# Patient Record
Sex: FEMALE | Race: OTHER | HISPANIC OR LATINO | Employment: OTHER | URBAN - METROPOLITAN AREA
[De-identification: names, ages, dates, MRNs, and addresses within clinical notes are randomized per-mention and may not be internally consistent; named-entity substitution may affect disease eponyms.]

---

## 2018-05-07 ENCOUNTER — CONVERSION ENCOUNTER (OUTPATIENT)
Dept: MAMMOGRAPHY | Facility: CLINIC | Age: 83
End: 2018-05-07

## 2018-10-22 RX ORDER — CARVEDILOL 12.5 MG/1
12.5 TABLET ORAL 2 TIMES DAILY WITH MEALS
Refills: 3 | COMMUNITY
Start: 2018-10-02 | End: 2018-12-31 | Stop reason: SDUPTHER

## 2018-10-22 RX ORDER — PRAVASTATIN SODIUM 10 MG
1 TABLET ORAL
Refills: 3 | COMMUNITY
Start: 2018-10-02 | End: 2018-12-31 | Stop reason: SDUPTHER

## 2018-10-22 RX ORDER — TRAMADOL HYDROCHLORIDE 50 MG/1
1 TABLET ORAL
COMMUNITY
Start: 2018-01-11 | End: 2019-09-08 | Stop reason: ALTCHOICE

## 2018-10-22 RX ORDER — GLIMEPIRIDE 2 MG/1
2 TABLET ORAL DAILY
Refills: 3 | COMMUNITY
Start: 2018-10-02 | End: 2018-12-31 | Stop reason: SDUPTHER

## 2018-10-22 RX ORDER — LANCETS 28 GAUGE
EACH MISCELLANEOUS
COMMUNITY
Start: 2018-01-15 | End: 2019-02-13 | Stop reason: SDUPTHER

## 2018-12-31 DIAGNOSIS — E11.9 TYPE 2 DIABETES MELLITUS WITHOUT COMPLICATION, WITHOUT LONG-TERM CURRENT USE OF INSULIN (HCC): Primary | ICD-10-CM

## 2018-12-31 RX ORDER — GLIMEPIRIDE 2 MG/1
2 TABLET ORAL
Qty: 30 TABLET | Refills: 0 | Status: SHIPPED | OUTPATIENT
Start: 2018-12-31 | End: 2019-02-05 | Stop reason: SDUPTHER

## 2018-12-31 RX ORDER — PRAVASTATIN SODIUM 10 MG
10 TABLET ORAL DAILY
Qty: 30 TABLET | Refills: 0 | Status: SHIPPED | OUTPATIENT
Start: 2018-12-31 | End: 2019-02-05 | Stop reason: SDUPTHER

## 2018-12-31 RX ORDER — CARVEDILOL 12.5 MG/1
12.5 TABLET ORAL 2 TIMES DAILY WITH MEALS
Qty: 60 TABLET | Refills: 0 | Status: SHIPPED | OUTPATIENT
Start: 2018-12-31 | End: 2019-02-05 | Stop reason: SDUPTHER

## 2018-12-31 NOTE — TELEPHONE ENCOUNTER
Metformin, pravastatin, glimiperride, carvedilol  Spoke with pt told him mom has not seen the doctor since January 2018 and needs an apt  He states she has a really bad fall and cant go down the steps he will try and bring her in

## 2019-02-04 ENCOUNTER — IN HOME VISIT (OUTPATIENT)
Dept: FAMILY MEDICINE CLINIC | Facility: CLINIC | Age: 84
End: 2019-02-04
Payer: MEDICARE

## 2019-02-04 DIAGNOSIS — M25.552 LEFT HIP PAIN: ICD-10-CM

## 2019-02-04 DIAGNOSIS — M25.552 PAIN OF BOTH HIP JOINTS: Primary | ICD-10-CM

## 2019-02-04 DIAGNOSIS — M25.551 PAIN OF BOTH HIP JOINTS: Primary | ICD-10-CM

## 2019-02-04 DIAGNOSIS — M25.562 CHRONIC PAIN OF LEFT KNEE: ICD-10-CM

## 2019-02-04 DIAGNOSIS — G89.29 CHRONIC BILATERAL LOW BACK PAIN WITH BILATERAL SCIATICA: ICD-10-CM

## 2019-02-04 DIAGNOSIS — N39.490 OVERFLOW INCONTINENCE OF URINE: ICD-10-CM

## 2019-02-04 DIAGNOSIS — R26.9 GAIT DIFFICULTY: ICD-10-CM

## 2019-02-04 DIAGNOSIS — I10 ESSENTIAL HYPERTENSION: ICD-10-CM

## 2019-02-04 DIAGNOSIS — R63.5 WEIGHT GAIN: ICD-10-CM

## 2019-02-04 DIAGNOSIS — Z11.59 ENCOUNTER FOR HEPATITIS C SCREENING TEST FOR LOW RISK PATIENT: ICD-10-CM

## 2019-02-04 DIAGNOSIS — M25.561 CHRONIC PAIN OF RIGHT KNEE: ICD-10-CM

## 2019-02-04 DIAGNOSIS — E11.9 TYPE 2 DIABETES MELLITUS WITHOUT COMPLICATION, WITHOUT LONG-TERM CURRENT USE OF INSULIN (HCC): ICD-10-CM

## 2019-02-04 DIAGNOSIS — E55.9 VITAMIN D DEFICIENCY: ICD-10-CM

## 2019-02-04 DIAGNOSIS — G89.29 CHRONIC PAIN OF RIGHT KNEE: ICD-10-CM

## 2019-02-04 DIAGNOSIS — M54.41 CHRONIC BILATERAL LOW BACK PAIN WITH BILATERAL SCIATICA: ICD-10-CM

## 2019-02-04 DIAGNOSIS — M54.42 CHRONIC BILATERAL LOW BACK PAIN WITH BILATERAL SCIATICA: ICD-10-CM

## 2019-02-04 DIAGNOSIS — G89.29 CHRONIC PAIN OF LEFT KNEE: ICD-10-CM

## 2019-02-04 DIAGNOSIS — M25.551 RIGHT HIP PAIN: ICD-10-CM

## 2019-02-04 PROCEDURE — 99349 HOME/RES VST EST MOD MDM 40: CPT | Performed by: FAMILY MEDICINE

## 2019-02-04 NOTE — LETTER
February 13, 2019     Flex Mendoza  [address] [facility] [address]    Patient: Flex Mendoza   YOB: 1933   Date of Visit: 2/4/2019       Consult home care, consult skilled nursing, consult physical therapy for home  Assess for home aide      Jack Pedersen MD        CC: No Recipients  Jack Pedersen MD  2/13/2019  8:18 AM  Sign at close encounter  Assessment/Plan:    I spent 1 hr and the condition of care and evaluating patient had her home  I am requesting visiting nurses, and physical therapy to come to home since patient is unable to go to a facility  Despite the request of bed patient having labs and x-rays, she is willing to do it but will wait until winter is over  She is in high risk for diabetes complications  Diagnoses and all orders for this visit:    Pain of both hip joints  Comments: We need to assess her hips with x-rays and do a follow-up after this is done  Patient declines going for x-ray until after winter  Type 2 diabetes mellitus without complication, without long-term current use of insulin ([facility])  Comments:  Patient does not follow with cause for more than one year, she is still on metformin and glimepiride  High risk for hypoglycemia  We need labs  Orders:  -     Discontinue: carvedilol (COREG) 12 5 mg tablet; Take 1 tablet (12 5 mg total) by mouth 2 (two) times a day with meals  -     Discontinue: glimepiride (AMARYL) 2 mg tablet; Take 1 tablet (2 mg total) by mouth daily with breakfast  -     Discontinue: metFORMIN (GLUCOPHAGE) 500 mg tablet; Take 1 tablet (500 mg total) by mouth 2 (two) times a day with meals  -     Discontinue: pravastatin (PRAVACHOL) 10 mg tablet; Take 1 tablet (10 mg total) by mouth daily  -     Comprehensive metabolic panel; Future  -     Lipid Panel with Direct LDL reflex;  Future  -     Hemoglobin A1C; Future  -     Microalbumin / creatinine urine ratio    Chronic bilateral low back pain with bilateral sciatica  Comments:  Since patient has radicular pain and weakness on legs would prefer to do a MRI of lumbar spine  Orders:  -     MRI lumbar spine wo contrast; Future    Weight gain  Comments:  Related to lack of activity  Gait difficulty    Essential hypertension  -     TSH, 3rd generation with Free T4 reflex; Future  -     CBC and differential; Future    Overflow incontinence of urine  -     UA w Reflex to Microscopic w Reflex to Culture    Encounter for hepatitis C screening test for low risk patient  Comments:  Explained the patient the need for hepatitis C screening  Orders:  -     Hepatitis C antibody; Future    Vitamin D deficiency  Comments: To check levels to decide treatment  Orders:  -     Vitamin D 25 hydroxy; Future    Left hip pain  Comments:  X-ray of left hip is needed  Orders:  -     XR hip/pelv 2-3 vws left if performed; Future    Right hip pain  Comments:  X-ray of right hip is needed  Orders:  -     XR hip/pelv 2-3 vws right if performed; Future    Chronic pain of left knee  Comments: To check x-ray of left knee  Orders:  -     XR knee 3 vw left non injury; Future    Chronic pain of right knee  Comments: To check x-ray of left knee  Orders:  -     XR knee 3 vw right non injury; Future    Other orders  -     FREESTYLE UNISTICK II LANCETS MISC; check blood sugar twice daily  -     glucose blood (FREESTYLE LITE) test strip; check blood sugar by Subcutaneous route 2 times every day  -     carvedilol (COREG) 12 5 mg tablet; Every 12 hours  -     glimepiride (AMARYL) 2 mg tablet; every 24 hours  -     metFORMIN (GLUCOPHAGE) 500 mg tablet; take 1 tablet (500MG)  by oral route 2 times every day with morning and evening meals  -     pravastatin (PRAVACHOL) 10 mg tablet; take 1 tablet (10MG)  by oral route  every day  -     Discontinue: traMADol (ULTRAM) 50 mg tablet; take 1/2 tablet by oral route  every 6 hours as needed          Subjective:      Patient ID: Bret Soto is a 80 y o  female  THIS IS A HOME VISIT  Patient reports multiple falls at home, she is confined to a 2nd floor due to gait difficulties and legs pain with weakness  She is not cooking him more  She lives with her son who does all the cooking home and helps with the activities of daily living  Patient complains severe pain in both hips  She is declining going to hospital for studies  She also states that she suffers of uterine prolapse and is causing urinary incontinence, and she needs to go to the bathroom very frequently  She has been confined to the 2nd floor for one year  She states that her blood sugar is okay, but is afraid to test herself  She also has chronic severe back pain, per patient is afraid to do procedures or tests in is very clear that she does not want any surgeries  She probably will accept physical therapy  She will set visiting nurses at home and possible visit for physical therapy  The following portions of the patient's history were reviewed and updated as appropriate: allergies, current medications, past family history, past medical history, past social history, past surgical history and problem list     Review of Systems   Constitutional: Positive for activity change ( she cannot go outside of down stairs due to pain in both hips and legs ) and unexpected weight change (Gaining weight possible 20 lb in the past one year )  HENT: Negative  Eyes: Positive for visual disturbance  Respiratory: Negative  Cardiovascular: Negative  Musculoskeletal: Positive for arthralgias ( almost all joints are hurting), back pain ( chronic ), gait problem (  Due to weakness of both legs, pain in both hips and knees ), joint swelling ( both knees ) and myalgias ( as above both legs  )  Neurological: Positive for weakness  Negative for seizures, syncope, speech difficulty and numbness     Psychiatric/Behavioral: Negative for agitation, behavioral problems, confusion, decreased concentration and sleep disturbance  Objective:      /80 (BP Location: Right arm, Patient Position: Sitting, Cuff Size: Large)   Pulse 80   Temp (!) 96 8 °F (36 °C) (Oral)   Resp 18   Ht 5' 9" (1 753 m)   Wt 95 3 kg (210 lb)   SpO2 96%   BMI 31 01 kg/m²           Physical Exam   HENT:   Head: Normocephalic  Eyes: Pupils are equal, round, and reactive to light  EOM are normal    Neck: Neck supple  Cardiovascular: Normal rate and regular rhythm  Pulmonary/Chest: Effort normal    Abdominal: Soft  There is tenderness  Musculoskeletal: Normal range of motion  She exhibits tenderness (Pain in decreased range of motion of both hips in both knees  Gait very unsteady  )  Neurological: She is alert  Skin: Skin is warm and dry  Psychiatric: She has a normal mood and affect   Her behavior is normal

## 2019-02-13 ENCOUNTER — TELEPHONE (OUTPATIENT)
Dept: FAMILY MEDICINE CLINIC | Facility: CLINIC | Age: 84
End: 2019-02-13

## 2019-02-13 VITALS
HEART RATE: 80 BPM | OXYGEN SATURATION: 96 % | TEMPERATURE: 96.8 F | HEIGHT: 69 IN | BODY MASS INDEX: 31.1 KG/M2 | SYSTOLIC BLOOD PRESSURE: 140 MMHG | DIASTOLIC BLOOD PRESSURE: 80 MMHG | RESPIRATION RATE: 18 BRPM | WEIGHT: 210 LBS

## 2019-02-13 PROBLEM — E11.9 TYPE 2 DIABETES MELLITUS WITHOUT COMPLICATION, WITHOUT LONG-TERM CURRENT USE OF INSULIN (HCC): Status: ACTIVE | Noted: 2019-02-13

## 2019-02-13 RX ORDER — GLIMEPIRIDE 2 MG/1
TABLET ORAL EVERY 24 HOURS
COMMUNITY
Start: 2018-01-11 | End: 2019-09-08 | Stop reason: ALTCHOICE

## 2019-02-13 RX ORDER — TRAMADOL HYDROCHLORIDE 50 MG/1
TABLET ORAL
COMMUNITY
Start: 2018-01-11 | End: 2019-02-13 | Stop reason: SDUPTHER

## 2019-02-13 RX ORDER — PRAVASTATIN SODIUM 10 MG
10 TABLET ORAL DAILY
Qty: 30 TABLET | Refills: 3 | Status: SHIPPED | OUTPATIENT
Start: 2019-02-13 | End: 2019-02-13 | Stop reason: SDUPTHER

## 2019-02-13 RX ORDER — PRAVASTATIN SODIUM 10 MG
TABLET ORAL
COMMUNITY
Start: 2018-01-11 | End: 2019-09-08 | Stop reason: SDUPTHER

## 2019-02-13 RX ORDER — CARVEDILOL 12.5 MG/1
12.5 TABLET ORAL 2 TIMES DAILY WITH MEALS
Qty: 60 TABLET | Refills: 3 | Status: SHIPPED | OUTPATIENT
Start: 2019-02-13 | End: 2019-02-13 | Stop reason: SDUPTHER

## 2019-02-13 RX ORDER — CARVEDILOL 12.5 MG/1
TABLET ORAL EVERY 12 HOURS
COMMUNITY
Start: 2018-01-11 | End: 2019-09-08 | Stop reason: SDUPTHER

## 2019-02-13 RX ORDER — LANCETS 21 GAUGE
EACH MISCELLANEOUS
COMMUNITY
Start: 2018-01-15 | End: 2019-09-08 | Stop reason: SDUPTHER

## 2019-02-13 RX ORDER — GLIMEPIRIDE 2 MG/1
2 TABLET ORAL
Qty: 30 TABLET | Refills: 3 | Status: SHIPPED | OUTPATIENT
Start: 2019-02-13 | End: 2019-02-13 | Stop reason: SDUPTHER

## 2019-02-13 NOTE — PROGRESS NOTES
Assessment/Plan:    I spent 1 hr and the coordination of care and evaluating patient at her home  I am requesting visiting nurses, and physical therapy to come to home, patient is unable to go to a facility  Despite the request to patient to get labs and x-rays, she is unwilling to do it, but will wait until winter is over  She is in high risk for diabetes complications and sedentary life  Diagnoses and all orders for this visit:    Pain of both hip joints  Comments: We need to assess her hips with x-rays and do a follow-up after this is done  Patient declines going for x-ray until after winter  Type 2 diabetes mellitus without complication, without long-term current use of insulin (HCC)  Comments:  Patient does not follow with cause for more than one year, she is still on metformin and glimepiride  High risk for hypoglycemia  We need labs  Orders:  -     Discontinue: carvedilol (COREG) 12 5 mg tablet; Take 1 tablet (12 5 mg total) by mouth 2 (two) times a day with meals  -     Discontinue: glimepiride (AMARYL) 2 mg tablet; Take 1 tablet (2 mg total) by mouth daily with breakfast  -     Discontinue: metFORMIN (GLUCOPHAGE) 500 mg tablet; Take 1 tablet (500 mg total) by mouth 2 (two) times a day with meals  -     Discontinue: pravastatin (PRAVACHOL) 10 mg tablet; Take 1 tablet (10 mg total) by mouth daily  -     Comprehensive metabolic panel; Future  -     Lipid Panel with Direct LDL reflex; Future  -     Hemoglobin A1C; Future  -     Microalbumin / creatinine urine ratio    Chronic bilateral low back pain with bilateral sciatica  Comments:  Since patient has radicular pain and weakness on legs would prefer to do a MRI of lumbar spine  Orders:  -     MRI lumbar spine wo contrast; Future    Weight gain  Comments:  Related to lack of activity  Gait difficulty    Essential hypertension  -     TSH, 3rd generation with Free T4 reflex;  Future  -     CBC and differential; Future    Overflow incontinence of urine  -     UA w Reflex to Microscopic w Reflex to Culture    Encounter for hepatitis C screening test for low risk patient  Comments:  Explained the patient the need for hepatitis C screening  Orders:  -     Hepatitis C antibody; Future    Vitamin D deficiency  Comments: To check levels to decide treatment  Orders:  -     Vitamin D 25 hydroxy; Future    Left hip pain  Comments:  X-ray of left hip is needed  Orders:  -     XR hip/pelv 2-3 vws left if performed; Future    Right hip pain  Comments:  X-ray of right hip is needed  Orders:  -     XR hip/pelv 2-3 vws right if performed; Future    Chronic pain of left knee  Comments: To check x-ray of left knee  Orders:  -     XR knee 3 vw left non injury; Future    Chronic pain of right knee  Comments: To check x-ray of left knee  Orders:  -     XR knee 3 vw right non injury; Future    Other orders  -     FREESTYLE UNISTICK II LANCETS MISC; check blood sugar twice daily  -     glucose blood (FREESTYLE LITE) test strip; check blood sugar by Subcutaneous route 2 times every day  -     carvedilol (COREG) 12 5 mg tablet; Every 12 hours  -     glimepiride (AMARYL) 2 mg tablet; every 24 hours  -     metFORMIN (GLUCOPHAGE) 500 mg tablet; take 1 tablet (500MG)  by oral route 2 times every day with morning and evening meals  -     pravastatin (PRAVACHOL) 10 mg tablet; take 1 tablet (10MG)  by oral route  every day  -     Discontinue: traMADol (ULTRAM) 50 mg tablet; take 1/2 tablet by oral route  every 6 hours as needed          Subjective:      Patient ID: Cris Kolb is a 80 y o  female  THIS IS A HOME VISIT  Patient reports multiple falls at home, she is confined to a 2nd floor due to gait difficulties and legs pain with weakness  She is not cooking him more  She lives with her son who does all the cooking home and helps with the activities of daily living  Patient complains severe pain in both hips    She is declining going to hospital for studies  She also states that she suffers of uterine prolapse and is causing urinary incontinence, and she needs to go to the bathroom very frequently  She has been confined to the 2nd floor for one year  She states that her blood sugar is okay, but is afraid to test herself  She also has chronic severe back pain, per patient is afraid to do procedures or tests in is very clear that she does not want any surgeries  She probably will accept physical therapy  She will set visiting nurses at home and possible visit for physical therapy  The following portions of the patient's history were reviewed and updated as appropriate: allergies, current medications, past family history, past medical history, past social history, past surgical history and problem list     Review of Systems   Constitutional: Positive for activity change ( she cannot go outside of down stairs due to pain in both hips and legs ) and unexpected weight change (Gaining weight possible 20 lb in the past one year )  HENT: Negative  Eyes: Positive for visual disturbance  Respiratory: Negative  Cardiovascular: Negative  Musculoskeletal: Positive for arthralgias ( almost all joints are hurting), back pain ( chronic ), gait problem (  Due to weakness of both legs, pain in both hips and knees ), joint swelling ( both knees ) and myalgias ( as above both legs  )  Neurological: Positive for weakness  Negative for seizures, syncope, speech difficulty and numbness  Psychiatric/Behavioral: Negative for agitation, behavioral problems, confusion, decreased concentration and sleep disturbance  Objective:      /80 (BP Location: Right arm, Patient Position: Sitting, Cuff Size: Large)   Pulse 80   Temp (!) 96 8 °F (36 °C) (Oral)   Resp 18   Ht 5' 9" (1 753 m)   Wt 95 3 kg (210 lb)   SpO2 96%   BMI 31 01 kg/m²          Physical Exam   HENT:   Head: Normocephalic  Eyes: Pupils are equal, round, and reactive to light   EOM are normal    Neck: Neck supple  Cardiovascular: Normal rate and regular rhythm  Pulmonary/Chest: Effort normal    Abdominal: Soft  There is tenderness  Musculoskeletal: Normal range of motion  She exhibits tenderness (Pain in decreased range of motion of both hips in both knees  Gait very unsteady  )  Neurological: She is alert  Skin: Skin is warm and dry  Psychiatric: She has a normal mood and affect   Her behavior is normal

## 2019-02-21 DIAGNOSIS — J06.9 ACUTE UPPER RESPIRATORY INFECTION: Primary | ICD-10-CM

## 2019-02-21 RX ORDER — OSELTAMIVIR PHOSPHATE 75 MG/1
75 CAPSULE ORAL EVERY 12 HOURS SCHEDULED
Qty: 10 CAPSULE | Refills: 0 | Status: SHIPPED | OUTPATIENT
Start: 2019-02-21 | End: 2019-02-26

## 2019-02-21 RX ORDER — DEXTROMETHORPHAN HYDROBROMIDE AND PROMETHAZINE HYDROCHLORIDE 15; 6.25 MG/5ML; MG/5ML
5 SYRUP ORAL 4 TIMES DAILY PRN
Qty: 118 ML | Refills: 0 | Status: SHIPPED | OUTPATIENT
Start: 2019-02-21 | End: 2019-09-08 | Stop reason: ALTCHOICE

## 2019-09-04 ENCOUNTER — IN HOME VISIT (OUTPATIENT)
Dept: FAMILY MEDICINE CLINIC | Facility: CLINIC | Age: 84
End: 2019-09-04
Payer: MEDICARE

## 2019-09-04 DIAGNOSIS — R29.898 MUSCULAR DECONDITIONING: ICD-10-CM

## 2019-09-04 DIAGNOSIS — E11.9 TYPE 2 DIABETES MELLITUS WITHOUT COMPLICATION, WITHOUT LONG-TERM CURRENT USE OF INSULIN (HCC): ICD-10-CM

## 2019-09-04 DIAGNOSIS — I10 ESSENTIAL HYPERTENSION: ICD-10-CM

## 2019-09-04 PROCEDURE — 99349 HOME/RES VST EST MOD MDM 40: CPT | Performed by: FAMILY MEDICINE

## 2019-09-04 NOTE — LETTER
September 8, 2019     [facility]  [address] [facility] [address]    Patient: Cayden Braun   YOB: 1933   Date of Visit: 9/4/2019       Patient has poor mobility, I am requesting skilled nurse and Physical therapist to visit her to ensure compliance and safety    She is confined to her 2nd floor apartment  She lives with her Son of name Jennyfer Pacheco  If you have questions, please do not hesitate to call me  I look forward to following your patient along with you  Sincerely,        Siva Zepeda MD        CC: No Recipients  Siva Zepeda MD  9/8/2019  4:06 PM  Incomplete  Assessment/Plan:    Essential hypertension  HTN/SUBOPTIMAL CONTROL: I discussed with patient regarding the need of compliance with medications  Exercise was encouraged as a change of life style modification  The main goal of treatment is to decrease the risk of mortality and of cardiovascular and renal morbidity  BP goal should be less than 130/80 mmHg in patients with renal disease, and less than 140/90 mmHg in all other patients  will continue on ACE inhibitor and beta-blocker      Type 2 diabetes mellitus without complication, without long-term current use of insulin (HCC)  With hypoglycemia  Patient having beta blockers in board is likely will not experience alarm symptoms of hypoglycemia  Stop glimepiride, checking labs, Visiting nurse to do so  Consulting physical therapy and safety assessment  Muscular deconditioning  With multiple falls she will have PT at home to assess the need for home PT and requesting strengthening therapy  Patient wont go to in-facility rehab  Diagnoses and all orders for this visit:    BMI 31 0-31 9,adult    Type 2 diabetes mellitus without complication, without long-term current use of insulin ([address] )  -     FREESTYLE UNISTICK II LANCETS MISC;  Inject under the skin 2 (two) times a day Check blood sugar  -     glucose blood (FREESTYLE LITE) test strip; 1 each by Other route 2 (two) times a day Use as instructed  -     pravastatin (PRAVACHOL) 10 mg tablet; Take 4 tablets (40 mg total) by mouth daily  -     metFORMIN (GLUCOPHAGE) 500 mg tablet; Take 1 tablet (500 mg total) by mouth 2 (two) times a day with meals  -     lisinopril (ZESTRIL) 5 mg tablet; Take 1 tablet (5 mg total) by mouth daily    Essential hypertension  -     carvedilol (COREG) 12 5 mg tablet; Take 1 tablet (12 5 mg total) by mouth 2 (two) times a day with meals    Muscular deconditioning          Subjective:      Patient ID: Jennifer España is a 80 y o  female  This is  A home visit, She lives in a second floor  She lives with her son, Raphael Avery a middle age man who is single, himself suffers of cirrhosis of the liver due to hepatits C infection from Drug use  He is on Methadone program   Yanet Craft is complaining of multiples falls and now she unable to get down the steps  The apartment is too small to get a electrical chair in steps  She is a diabetic patient that uses glimepiride  I found her with blood sugar of 88  When she checked my hand says I was hot, she was really cold and clammy , she did not feel effects of low blood sugar  She was able to answer questions  She like to read the bible but is unable to do it anymore  Her son cooks for her "not a diabetic diet" of course, "he does the best"  He also takes Carvedilol for HTN, pravastatin and metformin 500 mg  The following portions of the patient's history were reviewed and updated as appropriate: allergies, current medications, past family history, past medical history, past social history, past surgical history and problem list     Review of Systems   Constitutional: Positive for fatigue  Negative for diaphoresis, fever and unexpected weight change  Respiratory: Positive for shortness of breath  Negative for cough, chest tightness and wheezing  Cardiovascular: Positive for palpitations and leg swelling   Negative for chest pain    Gastrointestinal: Negative for abdominal pain, blood in stool, nausea and vomiting  Musculoskeletal: Positive for gait problem (mainly weakness of legs  )  Neurological: Positive for dizziness, tremors, weakness and light-headedness  Negative for syncope and headaches  Hematological: Does not bruise/bleed easily  Psychiatric/Behavioral: Negative for behavioral problems, self-injury and sleep disturbance  The patient is not nervous/anxious  Objective:      /90 (BP Location: Right arm, Patient Position: Sitting, Cuff Size: Standard)   Pulse 87   Temp 98 1 °F (36 7 °C)   Wt 97 5 kg (215 lb)   SpO2 95%   BMI 31 75 kg/m²    Blood sugar 88       Physical Exam   HENT:   Nose: Nose normal    Mouth/Throat: Oropharynx is clear and moist  No oropharyngeal exudate  Eyes: Pupils are equal, round, and reactive to light  EOM are normal  Right eye exhibits no discharge  Left eye exhibits no discharge  No scleral icterus  Cardiovascular: Normal rate, regular rhythm, normal heart sounds and intact distal pulses  Exam reveals no friction rub  No murmur heard  Pulmonary/Chest: Effort normal  No respiratory distress  She has no wheezes  She has no rales  She exhibits no tenderness  Musculoskeletal: Normal range of motion  generalized motor weakness and muscle deconditioning  Neurological: She is alert  Skin: No rash noted  No erythema  There is pallor  Cold and arms clammy  Psychiatric: She has a normal mood and affect  Her behavior is normal    Nursing note and vitals reviewed  BMI Counseling: Body mass index is 31 75 kg/m²  Discussed the patient's BMI with her  The BMI is above average  BMI counseling and education was provided to the patient  Exercise recommendations include exercising 3-5 times per week  Dre Moffett MD  9/5/2019 10:24 AM  Sign at close encounter  Assessment/Plan:    No problem-specific Assessment & Plan notes found for this encounter         Diagnoses and all orders for this visit:    BMI 31 0-31 9,adult          Subjective:      Patient ID: Stepan Spangler is a 80 y o  female  HPI    The following portions of the patient's history were reviewed and updated as appropriate: allergies, current medications, past family history, past medical history, past social history, past surgical history and problem list     Review of Systems   Constitutional: Negative for diaphoresis, fatigue, fever and unexpected weight change  Respiratory: Negative for cough, chest tightness, shortness of breath and wheezing  Cardiovascular: Negative for chest pain, palpitations and leg swelling  Gastrointestinal: Negative for abdominal pain, blood in stool, nausea and vomiting  Neurological: Negative for dizziness, syncope, light-headedness and headaches  Hematological: Does not bruise/bleed easily  Psychiatric/Behavioral: Negative for behavioral problems, self-injury and sleep disturbance  The patient is not nervous/anxious  Objective:      /90 (BP Location: Right arm, Patient Position: Sitting, Cuff Size: Standard)   Pulse 87   Temp 98 1 °F (36 7 °C)   Wt 97 5 kg (215 lb)   SpO2 95%   BMI 31 75 kg/m²           Physical Exam   HENT:   Nose: Nose normal    Mouth/Throat: Oropharynx is clear and moist  No oropharyngeal exudate  Eyes: Pupils are equal, round, and reactive to light  EOM are normal  Right eye exhibits no discharge  Left eye exhibits no discharge  No scleral icterus  Cardiovascular: Normal rate, regular rhythm, normal heart sounds and intact distal pulses  Exam reveals no friction rub  No murmur heard  Pulmonary/Chest: Effort normal  No respiratory distress  She has no wheezes  She has no rales  She exhibits no tenderness  Musculoskeletal: Normal range of motion  Neurological: She is alert  Skin: Skin is warm and dry  No rash noted  No erythema  Psychiatric: She has a normal mood and affect   Her behavior is normal    Nursing note and vitals reviewed  BMI Counseling: Body mass index is 31 75 kg/m²  Discussed the patient's BMI with her  The BMI is above average  BMI counseling and education was provided to the patient  Exercise recommendations include exercising 3-5 times per week

## 2019-09-05 VITALS
SYSTOLIC BLOOD PRESSURE: 160 MMHG | BODY MASS INDEX: 31.75 KG/M2 | DIASTOLIC BLOOD PRESSURE: 90 MMHG | TEMPERATURE: 98.1 F | WEIGHT: 215 LBS | HEART RATE: 87 BPM | OXYGEN SATURATION: 95 %

## 2019-09-05 NOTE — PATIENT INSTRUCTIONS
Obesity   AMBULATORY CARE:   Obesity  is when your body mass index (BMI) is greater than 30  Your healthcare provider will use your height and weight to measure your BMI  The risks of obesity include  many health problems, such as injuries or physical disability  You may need tests to check for the following:  · Diabetes     · High blood pressure or high cholesterol     · Heart disease     · Gallbladder or liver disease     · Cancer of the colon, breast, prostate, liver, or kidney     · Sleep apnea     · Arthritis or gout  Seek care immediately if:   · You have a severe headache, confusion, or difficulty speaking  · You have weakness on one side of your body  · You have chest pain, sweating, or shortness of breath  Contact your healthcare provider if:   · You have symptoms of gallbladder or liver disease, such as pain in your upper abdomen  · You have knee or hip pain and discomfort while walking  · You have symptoms of diabetes, such as intense hunger and thirst, and frequent urination  · You have symptoms of sleep apnea, such as snoring or daytime sleepiness  · You have questions or concerns about your condition or care  Treatment for obesity  focuses on helping you lose weight to improve your health  Even a small decrease in BMI can reduce the risk for many health problems  Your healthcare provider will help you set a weight-loss goal   · Lifestyle changes  are the first step in treating obesity  These include making healthy food choices and getting regular physical activity  Your healthcare provider may suggest a weight-loss program that involves coaching, education, and therapy  · Medicine  may help you lose weight when it is used with a healthy diet and physical activity  · Surgery  can help you lose weight if you are very obese and have other health problems  There are several types of weight-loss surgery  Ask your healthcare provider for more information    Be successful losing weight:   · Set small, realistic goals  An example of a small goal is to walk for 20 minutes 5 days a week  Anther goal is to lose 5% of your body weight  · Tell friends, family members, and coworkers about your goals  and ask for their support  Ask a friend to lose weight with you, or join a weight-loss support group  · Identify foods or triggers that may cause you to overeat , and find ways to avoid them  Remove tempting high-calorie foods from your home and workplace  Place a bowl of fresh fruit on your kitchen counter  If stress causes you to eat, then find other ways to cope with stress  · Keep a diary to track what you eat and drink  Also write down how many minutes of physical activity you do each day  Weigh yourself once a week and record it in your diary  Eating changes: You will need to eat 500 to 1,000 fewer calories each day than you currently eat to lose 1 to 2 pounds a week  The following changes will help you cut calories:  · Eat smaller portions  Use small plates, no larger than 9 inches in diameter  Fill your plate half full of fruits and vegetables  Measure your food using measuring cups until you know what a serving size looks like  · Eat 3 meals and 1 or 2 snacks each day  Plan your meals in advance  Jermain Rao and eat at home most of the time  Eat slowly  · Eat fruits and vegetables at every meal   They are low in calories and high in fiber, which makes you feel full  Do not add butter, margarine, or cream sauce to vegetables  Use herbs to season steamed vegetables  · Eat less fat and fewer fried foods  Eat more baked or grilled chicken and fish  These protein sources are lower in calories and fat than red meat  Limit fast food  Dress your salads with olive oil and vinegar instead of bottled dressing  · Limit the amount of sugar you eat  Do not drink sugary beverages  Limit alcohol  Activity changes:  Physical activity is good for your body in many ways   It helps you burn calories and build strong muscles  It decreases stress and depression, and improves your mood  It can also help you sleep better  Talk to your healthcare provider before you begin an exercise program   · Exercise for at least 30 minutes 5 days a week  Start slowly  Set aside time each day for physical activity that you enjoy and that is convenient for you  It is best to do both weight training and an activity that increases your heart rate, such as walking, bicycling, or swimming  · Find ways to be more active  Do yard work and housecleaning  Walk up the stairs instead of using elevators  Spend your leisure time going to events that require walking, such as outdoor festivals or fairs  This extra physical activity can help you lose weight and keep it off  Follow up with your healthcare provider as directed: You may need to meet with a dietitian  Write down your questions so you remember to ask them during your visits  © 2017 [employer] Information is for End User's use only and may not be sold, redistributed or otherwise used for commercial purposes  All illustrations and images included in CareNotes® are the copyrighted property of A D A M , Inc  or Jose Alfredo Benavides  The above information is an  only  It is not intended as medical advice for individual conditions or treatments  Talk to your doctor, nurse or pharmacist before following any medical regimen to see if it is safe and effective for you

## 2019-09-05 NOTE — PROGRESS NOTES
Assessment/Plan:    Essential hypertension  HTN/SUBOPTIMAL CONTROL: I discussed with patient regarding the need of compliance with medications  Exercise was encouraged as a change of life style modification  The main goal of treatment is to decrease the risk of mortality and of cardiovascular and renal morbidity  BP goal should be less than 130/80 mmHg in patients with renal disease, and less than 140/90 mmHg in all other patients  will continue on ACE inhibitor and beta-blocker      Type 2 diabetes mellitus without complication, without long-term current use of insulin ([facility])  With hypoglycemia  Patient having beta blockers in board is likely will not experience alarm symptoms of hypoglycemia  Stop glimepiride, checking labs, Visiting nurse to do so  Consulting physical therapy and safety assessment  Muscular deconditioning  With multiple falls she will have PT at home to assess the need for home PT and requesting strengthening therapy  Patient wont go to in-facility rehab  Diagnoses and all orders for this visit:    BMI 31 0-31 9,adult    Type 2 diabetes mellitus without complication, without long-term current use of insulin ([facility] [address] )  -     FREESTYLE UNISTICK II LANCETS MISC; Inject under the skin 2 (two) times a day Check blood sugar  -     glucose blood (FREESTYLE LITE) test strip; 1 each by Other route 2 (two) times a day Use as instructed  -     pravastatin (PRAVACHOL) 10 mg tablet; Take 4 tablets (40 mg total) by mouth daily  -     metFORMIN (GLUCOPHAGE) 500 mg tablet; Take 1 tablet (500 mg total) by mouth 2 (two) times a day with meals  -     lisinopril (ZESTRIL) 5 mg tablet; Take 1 tablet (5 mg total) by mouth daily    Essential hypertension  -     carvedilol (COREG) 12 5 mg tablet; Take 1 tablet (12 5 mg total) by mouth 2 (two) times a day with meals    Muscular deconditioning          Subjective:      Patient ID: Ryan Allan is a 80 y o  female      This is  A home visit, She lives in a second floor  She lives with her son, Litzy Cabello a middle age man who is single, himself suffers of cirrhosis of the liver due to hepatits C infection from Drug use  He is on Methadone program   Sara Kilpatrick is complaining of multiples falls and now she unable to get down the steps  The apartment is too small to get a electrical chair in steps  She is a diabetic patient that uses glimepiride  I found her with blood sugar of 88  When she checked my hand says I was hot, she was really cold and clammy , she did not feel effects of low blood sugar  She was able to answer questions  She like to read the bible but is unable to do it anymore  Her son cooks for her "not a diabetic diet" of course, "he does the best"  He also takes Carvedilol for HTN, pravastatin and metformin 500 mg  The following portions of the patient's history were reviewed and updated as appropriate: allergies, current medications, past family history, past medical history, past social history, past surgical history and problem list     Review of Systems   Constitutional: Positive for fatigue  Negative for diaphoresis, fever and unexpected weight change  Respiratory: Positive for shortness of breath  Negative for cough, chest tightness and wheezing  Cardiovascular: Positive for palpitations and leg swelling  Negative for chest pain  Gastrointestinal: Negative for abdominal pain, blood in stool, nausea and vomiting  Musculoskeletal: Positive for gait problem (mainly weakness of legs  )  Neurological: Positive for dizziness, tremors, weakness and light-headedness  Negative for syncope and headaches  Hematological: Does not bruise/bleed easily  Psychiatric/Behavioral: Negative for behavioral problems, self-injury and sleep disturbance  The patient is not nervous/anxious            Objective:      /90 (BP Location: Right arm, Patient Position: Sitting, Cuff Size: Standard)   Pulse 87   Temp 98 1 °F (36 7 °C)   Wt 97 5 kg (215 lb)   SpO2 95%   BMI 31 75 kg/m²   Blood sugar 88       Physical Exam   HENT:   Nose: Nose normal    Mouth/Throat: Oropharynx is clear and moist  No oropharyngeal exudate  Eyes: Pupils are equal, round, and reactive to light  EOM are normal  Right eye exhibits no discharge  Left eye exhibits no discharge  No scleral icterus  Cardiovascular: Normal rate, regular rhythm, normal heart sounds and intact distal pulses  Exam reveals no friction rub  No murmur heard  Pulmonary/Chest: Effort normal  No respiratory distress  She has no wheezes  She has no rales  She exhibits no tenderness  Musculoskeletal: Normal range of motion  generalized motor weakness and muscle deconditioning  Neurological: She is alert  Skin: No rash noted  No erythema  There is pallor  Cold and arms clammy  Psychiatric: She has a normal mood and affect  Her behavior is normal    Nursing note and vitals reviewed  BMI Counseling: Body mass index is 31 75 kg/m²  Discussed the patient's BMI with her  The BMI is above average  BMI counseling and education was provided to the patient  Exercise recommendations include exercising 3-5 times per week

## 2019-09-08 PROBLEM — R29.898 MUSCULAR DECONDITIONING: Status: ACTIVE | Noted: 2019-09-08

## 2019-09-08 RX ORDER — CARVEDILOL 12.5 MG/1
12.5 TABLET ORAL 2 TIMES DAILY WITH MEALS
Qty: 180 TABLET | Refills: 3 | Status: SHIPPED | OUTPATIENT
Start: 2019-09-08

## 2019-09-08 RX ORDER — PRAVASTATIN SODIUM 10 MG
40 TABLET ORAL DAILY
Qty: 90 TABLET | Refills: 3 | Status: SHIPPED | OUTPATIENT
Start: 2019-09-08

## 2019-09-08 RX ORDER — LANCETS 21 GAUGE
EACH MISCELLANEOUS 2 TIMES DAILY
Qty: 100 EACH | Refills: 5 | Status: SHIPPED | OUTPATIENT
Start: 2019-09-08

## 2019-09-08 RX ORDER — LISINOPRIL 5 MG/1
5 TABLET ORAL DAILY
Qty: 90 TABLET | Refills: 3 | Status: SHIPPED | OUTPATIENT
Start: 2019-09-08 | End: 2020-01-17 | Stop reason: HOSPADM

## 2019-09-08 NOTE — ASSESSMENT & PLAN NOTE
HTN/SUBOPTIMAL CONTROL: I discussed with patient regarding the need of compliance with medications  Exercise was encouraged as a change of life style modification  The main goal of treatment is to decrease the risk of mortality and of cardiovascular and renal morbidity  BP goal should be less than 130/80 mmHg in patients with renal disease, and less than 140/90 mmHg in all other patients     will continue on ACE inhibitor and beta-blocker

## 2019-09-08 NOTE — ASSESSMENT & PLAN NOTE
With hypoglycemia  Patient having beta blockers in board is likely will not experience alarm symptoms of hypoglycemia  Stop glimepiride, checking labs, Visiting nurse to do so  Consulting physical therapy and safety assessment

## 2019-09-08 NOTE — ASSESSMENT & PLAN NOTE
With multiple falls she will have PT at home to assess the need for home PT and requesting strengthening therapy  Patient wont go to in-facility rehab

## 2019-09-09 DIAGNOSIS — I10 ESSENTIAL HYPERTENSION: ICD-10-CM

## 2019-09-09 DIAGNOSIS — E11.9 TYPE 2 DIABETES MELLITUS WITHOUT COMPLICATION, WITHOUT LONG-TERM CURRENT USE OF INSULIN (HCC): ICD-10-CM

## 2019-09-09 RX ORDER — PRAVASTATIN SODIUM 10 MG
40 TABLET ORAL DAILY
Qty: 90 TABLET | Refills: 3 | Status: CANCELLED | OUTPATIENT
Start: 2019-09-09

## 2019-09-09 RX ORDER — LISINOPRIL 5 MG/1
5 TABLET ORAL DAILY
Qty: 90 TABLET | Refills: 3 | Status: CANCELLED | OUTPATIENT
Start: 2019-09-09

## 2019-09-09 RX ORDER — CARVEDILOL 12.5 MG/1
12.5 TABLET ORAL 2 TIMES DAILY WITH MEALS
Qty: 180 TABLET | Refills: 3 | Status: CANCELLED | OUTPATIENT
Start: 2019-09-09

## 2020-01-15 ENCOUNTER — APPOINTMENT (EMERGENCY)
Dept: CT IMAGING | Facility: HOSPITAL | Age: 85
End: 2020-01-15
Payer: MEDICARE

## 2020-01-15 ENCOUNTER — HOSPITAL ENCOUNTER (EMERGENCY)
Facility: HOSPITAL | Age: 85
End: 2020-01-16
Attending: EMERGENCY MEDICINE | Admitting: EMERGENCY MEDICINE
Payer: MEDICARE

## 2020-01-15 DIAGNOSIS — E87.5 HYPERKALEMIA: ICD-10-CM

## 2020-01-15 DIAGNOSIS — D72.829 LEUKOCYTOSIS: Primary | ICD-10-CM

## 2020-01-15 PROBLEM — R00.0 TACHYCARDIA: Status: ACTIVE | Noted: 2020-01-15

## 2020-01-15 LAB
ALBUMIN SERPL BCP-MCNC: 3.6 G/DL (ref 3–5.2)
ALP SERPL-CCNC: 188 U/L (ref 43–122)
ALT SERPL W P-5'-P-CCNC: 152 U/L (ref 9–52)
ANION GAP SERPL CALCULATED.3IONS-SCNC: 3 MMOL/L (ref 5–14)
APTT PPP: 26 SECONDS (ref 25–32)
AST SERPL W P-5'-P-CCNC: 154 U/L (ref 14–36)
BILIRUB SERPL-MCNC: 2.1 MG/DL
BUN SERPL-MCNC: 12 MG/DL (ref 5–25)
CALCIUM SERPL-MCNC: 7.9 MG/DL (ref 8.4–10.2)
CHLORIDE SERPL-SCNC: 98 MMOL/L (ref 97–108)
CO2 SERPL-SCNC: 24 MMOL/L (ref 22–30)
CREAT SERPL-MCNC: 0.96 MG/DL (ref 0.6–1.2)
ERYTHROCYTE [DISTWIDTH] IN BLOOD BY AUTOMATED COUNT: 19 %
GFR SERPL CREATININE-BSD FRML MDRD: 54 ML/MIN/1.73SQ M
GLUCOSE SERPL-MCNC: 177 MG/DL (ref 70–99)
HCT VFR BLD AUTO: 38.6 % (ref 36–46)
HGB BLD-MCNC: 12.2 G/DL (ref 12–16)
INR PPP: 1.2 (ref 0.91–1.09)
LACTATE SERPL-SCNC: 1.7 MMOL/L (ref 0.7–2)
LIPASE SERPL-CCNC: 174 U/L (ref 23–300)
MAGNESIUM SERPL-MCNC: 1.8 MG/DL (ref 1.6–2.3)
MCH RBC QN AUTO: 31 PG (ref 26.8–34.3)
MCHC RBC AUTO-ENTMCNC: 31.7 G/DL (ref 31.4–37.4)
MCV RBC AUTO: 98 FL (ref 80–100)
PHOSPHATE SERPL-MCNC: 3.2 MG/DL (ref 2.5–4.8)
PLATELET # BLD AUTO: 140 THOUSANDS/UL (ref 150–450)
PMV BLD AUTO: 6.8 FL (ref 8.9–12.7)
POTASSIUM SERPL-SCNC: >10 MMOL/L (ref 3.6–5)
PROT SERPL-MCNC: 6.3 G/DL (ref 5.9–8.4)
PROTHROMBIN TIME: 13.3 SECONDS (ref 9.8–12)
RBC # BLD AUTO: 3.95 MILLION/UL (ref 4–5.2)
SODIUM SERPL-SCNC: 125 MMOL/L (ref 137–147)
TROPONIN I SERPL-MCNC: 0.02 NG/ML (ref 0–0.03)
WBC # BLD AUTO: >400 THOUSAND/UL (ref 4.31–10.16)

## 2020-01-15 PROCEDURE — 85007 BL SMEAR W/DIFF WBC COUNT: CPT | Performed by: PHYSICIAN ASSISTANT

## 2020-01-15 PROCEDURE — 85730 THROMBOPLASTIN TIME PARTIAL: CPT | Performed by: PHYSICIAN ASSISTANT

## 2020-01-15 PROCEDURE — 96361 HYDRATE IV INFUSION ADD-ON: CPT

## 2020-01-15 PROCEDURE — 83605 ASSAY OF LACTIC ACID: CPT | Performed by: PHYSICIAN ASSISTANT

## 2020-01-15 PROCEDURE — 96360 HYDRATION IV INFUSION INIT: CPT

## 2020-01-15 PROCEDURE — 99283 EMERGENCY DEPT VISIT LOW MDM: CPT | Performed by: PHYSICIAN ASSISTANT

## 2020-01-15 PROCEDURE — 74177 CT ABD & PELVIS W/CONTRAST: CPT

## 2020-01-15 PROCEDURE — 85610 PROTHROMBIN TIME: CPT | Performed by: PHYSICIAN ASSISTANT

## 2020-01-15 PROCEDURE — 83735 ASSAY OF MAGNESIUM: CPT | Performed by: PHYSICIAN ASSISTANT

## 2020-01-15 PROCEDURE — 87040 BLOOD CULTURE FOR BACTERIA: CPT | Performed by: PHYSICIAN ASSISTANT

## 2020-01-15 PROCEDURE — 93005 ELECTROCARDIOGRAM TRACING: CPT

## 2020-01-15 PROCEDURE — 84484 ASSAY OF TROPONIN QUANT: CPT | Performed by: PHYSICIAN ASSISTANT

## 2020-01-15 PROCEDURE — 99285 EMERGENCY DEPT VISIT HI MDM: CPT

## 2020-01-15 PROCEDURE — 36415 COLL VENOUS BLD VENIPUNCTURE: CPT | Performed by: PHYSICIAN ASSISTANT

## 2020-01-15 PROCEDURE — NC001 PR NO CHARGE: Performed by: PHYSICIAN ASSISTANT

## 2020-01-15 PROCEDURE — 83690 ASSAY OF LIPASE: CPT | Performed by: PHYSICIAN ASSISTANT

## 2020-01-15 PROCEDURE — 85027 COMPLETE CBC AUTOMATED: CPT | Performed by: PHYSICIAN ASSISTANT

## 2020-01-15 PROCEDURE — 80053 COMPREHEN METABOLIC PANEL: CPT | Performed by: PHYSICIAN ASSISTANT

## 2020-01-15 PROCEDURE — 84100 ASSAY OF PHOSPHORUS: CPT | Performed by: PHYSICIAN ASSISTANT

## 2020-01-15 RX ADMIN — SODIUM CHLORIDE 1000 ML: 0.9 INJECTION, SOLUTION INTRAVENOUS at 21:43

## 2020-01-15 RX ADMIN — IODIXANOL 100 ML: 320 INJECTION, SOLUTION INTRAVASCULAR at 23:18

## 2020-01-16 ENCOUNTER — APPOINTMENT (INPATIENT)
Dept: ULTRASOUND IMAGING | Facility: HOSPITAL | Age: 85
DRG: 841 | End: 2020-01-16
Payer: MEDICARE

## 2020-01-16 ENCOUNTER — TELEPHONE (OUTPATIENT)
Dept: FAMILY MEDICINE CLINIC | Facility: CLINIC | Age: 85
End: 2020-01-16

## 2020-01-16 ENCOUNTER — HOSPITAL ENCOUNTER (INPATIENT)
Facility: HOSPITAL | Age: 85
LOS: 1 days | Discharge: ANOTHER HOSPITAL | DRG: 841 | End: 2020-01-17
Attending: INTERNAL MEDICINE | Admitting: INTERNAL MEDICINE
Payer: MEDICARE

## 2020-01-16 VITALS
HEART RATE: 122 BPM | WEIGHT: 214.29 LBS | TEMPERATURE: 97.7 F | BODY MASS INDEX: 31.64 KG/M2 | RESPIRATION RATE: 20 BRPM | SYSTOLIC BLOOD PRESSURE: 143 MMHG | OXYGEN SATURATION: 97 % | DIASTOLIC BLOOD PRESSURE: 71 MMHG

## 2020-01-16 DIAGNOSIS — R74.01 TRANSAMINITIS: Primary | ICD-10-CM

## 2020-01-16 DIAGNOSIS — R62.7 FAILURE TO THRIVE IN ADULT: ICD-10-CM

## 2020-01-16 DIAGNOSIS — D72.829 LEUKOCYTOSIS: ICD-10-CM

## 2020-01-16 DIAGNOSIS — C91.10 CLL (CHRONIC LYMPHOCYTIC LEUKEMIA) (HCC): ICD-10-CM

## 2020-01-16 DIAGNOSIS — E87.1 HYPONATREMIA: ICD-10-CM

## 2020-01-16 DIAGNOSIS — R93.2 ABNORMAL FINDINGS ON DIAGNOSTIC IMAGING OF GALLBLADDER: ICD-10-CM

## 2020-01-16 DIAGNOSIS — N30.00 ACUTE CYSTITIS WITHOUT HEMATURIA: ICD-10-CM

## 2020-01-16 DIAGNOSIS — R79.89 INCREASED THYROID STIMULATING HORMONE (TSH) LEVEL: ICD-10-CM

## 2020-01-16 PROBLEM — E83.51 HYPOCALCEMIA: Status: ACTIVE | Noted: 2020-01-16

## 2020-01-16 PROBLEM — N85.00 ENDOMETRIAL HYPERPLASIA: Status: ACTIVE | Noted: 2020-01-16

## 2020-01-16 PROBLEM — N18.30 CKD (CHRONIC KIDNEY DISEASE) STAGE 3, GFR 30-59 ML/MIN (HCC): Status: ACTIVE | Noted: 2020-01-16

## 2020-01-16 LAB
25(OH)D3 SERPL-MCNC: 125.1 NG/ML (ref 30–100)
ALBUMIN SERPL BCP-MCNC: 3.3 G/DL (ref 3.5–5)
ALBUMIN SERPL BCP-MCNC: 3.4 G/DL (ref 3.5–5)
ALP SERPL-CCNC: 229 U/L (ref 46–116)
ALP SERPL-CCNC: 237 U/L (ref 46–116)
ALT SERPL W P-5'-P-CCNC: 135 U/L (ref 12–78)
ALT SERPL W P-5'-P-CCNC: 140 U/L (ref 12–78)
ANION GAP SERPL CALCULATED.3IONS-SCNC: 13 MMOL/L (ref 4–13)
ANION GAP SERPL CALCULATED.3IONS-SCNC: 4 MMOL/L (ref 4–13)
ANISOCYTOSIS BLD QL SMEAR: PRESENT
ANISOCYTOSIS BLD QL SMEAR: PRESENT
AST SERPL W P-5'-P-CCNC: 102 U/L (ref 5–45)
AST SERPL W P-5'-P-CCNC: 167 U/L (ref 5–45)
BACTERIA UR QL AUTO: ABNORMAL /HPF
BASE EXCESS BLDA CALC-SCNC: -1 MMOL/L (ref -2–3)
BASOPHILS # BLD MANUAL: 0 THOUSAND/UL (ref 0–0.1)
BASOPHILS NFR MAR MANUAL: 0 % (ref 0–1)
BILIRUB SERPL-MCNC: 1.61 MG/DL (ref 0.2–1)
BILIRUB SERPL-MCNC: 1.61 MG/DL (ref 0.2–1)
BILIRUB UR QL STRIP: NEGATIVE
BUN SERPL-MCNC: 10 MG/DL (ref 5–25)
BUN SERPL-MCNC: 10 MG/DL (ref 5–25)
CA-I BLD-SCNC: 0.78 MMOL/L (ref 1.12–1.32)
CA-I BLD-SCNC: 1.13 MMOL/L (ref 1.12–1.32)
CALCIUM SERPL-MCNC: 7.8 MG/DL (ref 8.3–10.1)
CALCIUM SERPL-MCNC: 8.4 MG/DL (ref 8.3–10.1)
CHLORIDE SERPL-SCNC: 102 MMOL/L (ref 100–108)
CHLORIDE SERPL-SCNC: 99 MMOL/L (ref 100–108)
CLARITY UR: CLEAR
CO2 SERPL-SCNC: 23 MMOL/L (ref 21–32)
CO2 SERPL-SCNC: 24 MMOL/L (ref 21–32)
COLOR UR: YELLOW
CREAT SERPL-MCNC: 0.93 MG/DL (ref 0.6–1.3)
CREAT SERPL-MCNC: 1.01 MG/DL (ref 0.6–1.3)
EOSINOPHIL # BLD MANUAL: 0 THOUSAND/UL (ref 0–0.4)
EOSINOPHIL NFR BLD MANUAL: 0 % (ref 0–6)
ERYTHROCYTE [DISTWIDTH] IN BLOOD BY AUTOMATED COUNT: 19.1 % (ref 11.6–15.1)
EST. AVERAGE GLUCOSE BLD GHB EST-MCNC: 203 MG/DL
GFR SERPL CREATININE-BSD FRML MDRD: 51 ML/MIN/1.73SQ M
GFR SERPL CREATININE-BSD FRML MDRD: 56 ML/MIN/1.73SQ M
GLUCOSE SERPL-MCNC: 143 MG/DL (ref 65–140)
GLUCOSE SERPL-MCNC: 166 MG/DL (ref 65–140)
GLUCOSE SERPL-MCNC: 175 MG/DL (ref 65–140)
GLUCOSE SERPL-MCNC: 179 MG/DL (ref 65–140)
GLUCOSE SERPL-MCNC: 183 MG/DL (ref 65–140)
GLUCOSE SERPL-MCNC: 184 MG/DL (ref 65–140)
GLUCOSE UR STRIP-MCNC: NEGATIVE MG/DL
HAV IGM SER QL: NORMAL
HBA1C MFR BLD: 8.7 % (ref 4.2–6.3)
HBV CORE IGM SER QL: NORMAL
HBV SURFACE AG SER QL: NORMAL
HCO3 BLDA-SCNC: 24.6 MMOL/L (ref 22–28)
HCT VFR BLD AUTO: 33.6 % (ref 34.8–46.1)
HCT VFR BLD CALC: 48 % (ref 34.8–46.1)
HCV AB SER QL: NORMAL
HGB BLD-MCNC: 10.8 G/DL (ref 11.5–15.4)
HGB BLDA-MCNC: 16.3 G/DL (ref 11.5–15.4)
HGB UR QL STRIP.AUTO: 10
KETONES UR STRIP-MCNC: ABNORMAL MG/DL
LEUKOCYTE ESTERASE UR QL STRIP: 100
LYMPHOCYTES # BLD AUTO: 534.54 THOUSAND/UL (ref 0.6–4.47)
LYMPHOCYTES # BLD AUTO: 70 % (ref 25–45)
LYMPHOCYTES # BLD AUTO: 83 % (ref 14–44)
LYMPHOCYTES # BLD AUTO: >280 THOUSAND/UL (ref 0.5–4)
MACROCYTES BLD QL AUTO: PRESENT
MCH RBC QN AUTO: 32.1 PG (ref 26.8–34.3)
MCHC RBC AUTO-ENTMCNC: 32.1 G/DL (ref 31.4–37.4)
MCV RBC AUTO: 100 FL (ref 82–98)
MONOCYTES # BLD AUTO: 12.88 THOUSAND/UL (ref 0–1.22)
MONOCYTES # BLD AUTO: >12 THOUSAND/UL (ref 0.2–0.9)
MONOCYTES NFR BLD AUTO: 3 % (ref 1–10)
MONOCYTES NFR BLD: 2 % (ref 4–12)
NEUTROPHILS # BLD MANUAL: 32.2 THOUSAND/UL (ref 1.85–7.62)
NEUTS SEG # BLD: >8 THOUSAND/UL (ref 1.8–7.8)
NEUTS SEG NFR BLD AUTO: 2 %
NEUTS SEG NFR BLD AUTO: 5 % (ref 43–75)
NITRITE UR QL STRIP: NEGATIVE
NON-SQ EPI CELLS URNS QL MICRO: ABNORMAL /HPF
NRBC BLD AUTO-RTO: 1 /100 WBCS
OSMOLALITY UR/SERPL-RTO: 295 MMOL/KG (ref 282–298)
PATHOLOGIST INTERPRETATION: NORMAL
PATHOLOGY REVIEW: YES
PCO2 BLD: 26 MMOL/L (ref 21–32)
PCO2 BLD: 41.3 MM HG (ref 36–44)
PH BLD: 7.38 [PH] (ref 7.35–7.45)
PH UR STRIP.AUTO: 6 [PH]
PLATELET # BLD AUTO: 102 THOUSANDS/UL (ref 149–390)
PLATELET BLD QL SMEAR: ABNORMAL
PLATELET BLD QL SMEAR: ABNORMAL
PMV BLD AUTO: 9.2 FL (ref 8.9–12.7)
PO2 BLD: 44 MM HG (ref 75–129)
POIKILOCYTOSIS BLD QL SMEAR: PRESENT
POTASSIUM BLD-SCNC: 3.3 MMOL/L (ref 3.5–5.3)
POTASSIUM SERPL-SCNC: 5.3 MMOL/L (ref 3.5–5.3)
POTASSIUM SERPL-SCNC: 5.3 MMOL/L (ref 3.5–5.3)
POTASSIUM SERPL-SCNC: >10 MMOL/L (ref 3.5–5.3)
PROT SERPL-MCNC: 6.4 G/DL (ref 6.4–8.2)
PROT SERPL-MCNC: 6.7 G/DL (ref 6.4–8.2)
PROT UR STRIP-MCNC: NEGATIVE MG/DL
RBC # BLD AUTO: 3.36 MILLION/UL (ref 3.81–5.12)
RBC #/AREA URNS AUTO: ABNORMAL /HPF
RBC MORPH BLD: ABNORMAL
SAO2 % BLD FROM PO2: 79 % (ref 60–85)
SODIUM BLD-SCNC: 138 MMOL/L (ref 136–145)
SODIUM SERPL-SCNC: 126 MMOL/L (ref 136–145)
SODIUM SERPL-SCNC: 139 MMOL/L (ref 136–145)
SP GR UR STRIP.AUTO: 1.01 (ref 1–1.04)
SPECIMEN SOURCE: ABNORMAL
T4 FREE SERPL-MCNC: 0.41 NG/DL (ref 0.76–1.46)
T4 FREE SERPL-MCNC: 0.43 NG/DL (ref 0.76–1.46)
TOTAL CELLS COUNTED SPEC: 100
TOTAL CELLS COUNTED SPEC: 100
TSH SERPL DL<=0.05 MIU/L-ACNC: 65.97 UIU/ML (ref 0.36–3.74)
UNIDENT CELLS # BLD: 25 % (ref 0–0)
URATE SERPL-MCNC: 8.3 MG/DL (ref 2–6.8)
UROBILINOGEN UA: NEGATIVE MG/DL
VARIANT LYMPHS # BLD AUTO: 10 %
WBC # BLD AUTO: 644.02 THOUSAND/UL (ref 4.31–10.16)
WBC #/AREA URNS AUTO: ABNORMAL /HPF

## 2020-01-16 PROCEDURE — 83036 HEMOGLOBIN GLYCOSYLATED A1C: CPT | Performed by: NURSE PRACTITIONER

## 2020-01-16 PROCEDURE — 87186 SC STD MICRODIL/AGAR DIL: CPT | Performed by: PHYSICIAN ASSISTANT

## 2020-01-16 PROCEDURE — 82306 VITAMIN D 25 HYDROXY: CPT | Performed by: NURSE PRACTITIONER

## 2020-01-16 PROCEDURE — 85027 COMPLETE CBC AUTOMATED: CPT | Performed by: NURSE PRACTITIONER

## 2020-01-16 PROCEDURE — 99223 1ST HOSP IP/OBS HIGH 75: CPT | Performed by: INTERNAL MEDICINE

## 2020-01-16 PROCEDURE — 80053 COMPREHEN METABOLIC PANEL: CPT | Performed by: NURSE PRACTITIONER

## 2020-01-16 PROCEDURE — 84439 ASSAY OF FREE THYROXINE: CPT | Performed by: INTERNAL MEDICINE

## 2020-01-16 PROCEDURE — 84132 ASSAY OF SERUM POTASSIUM: CPT | Performed by: INTERNAL MEDICINE

## 2020-01-16 PROCEDURE — 97163 PT EVAL HIGH COMPLEX 45 MIN: CPT | Performed by: PHYSICAL THERAPIST

## 2020-01-16 PROCEDURE — 82803 BLOOD GASES ANY COMBINATION: CPT

## 2020-01-16 PROCEDURE — 76705 ECHO EXAM OF ABDOMEN: CPT

## 2020-01-16 PROCEDURE — 99222 1ST HOSP IP/OBS MODERATE 55: CPT | Performed by: INTERNAL MEDICINE

## 2020-01-16 PROCEDURE — 76856 US EXAM PELVIC COMPLETE: CPT

## 2020-01-16 PROCEDURE — 87077 CULTURE AEROBIC IDENTIFY: CPT | Performed by: PHYSICIAN ASSISTANT

## 2020-01-16 PROCEDURE — 82948 REAGENT STRIP/BLOOD GLUCOSE: CPT

## 2020-01-16 PROCEDURE — 97167 OT EVAL HIGH COMPLEX 60 MIN: CPT

## 2020-01-16 PROCEDURE — 84439 ASSAY OF FREE THYROXINE: CPT | Performed by: NURSE PRACTITIONER

## 2020-01-16 PROCEDURE — 80053 COMPREHEN METABOLIC PANEL: CPT | Performed by: INTERNAL MEDICINE

## 2020-01-16 PROCEDURE — 82947 ASSAY GLUCOSE BLOOD QUANT: CPT

## 2020-01-16 PROCEDURE — 84443 ASSAY THYROID STIM HORMONE: CPT | Performed by: NURSE PRACTITIONER

## 2020-01-16 PROCEDURE — 81003 URINALYSIS AUTO W/O SCOPE: CPT | Performed by: PHYSICIAN ASSISTANT

## 2020-01-16 PROCEDURE — 84132 ASSAY OF SERUM POTASSIUM: CPT

## 2020-01-16 PROCEDURE — 85007 BL SMEAR W/DIFF WBC COUNT: CPT | Performed by: NURSE PRACTITIONER

## 2020-01-16 PROCEDURE — 80074 ACUTE HEPATITIS PANEL: CPT | Performed by: NURSE PRACTITIONER

## 2020-01-16 PROCEDURE — 82330 ASSAY OF CALCIUM: CPT

## 2020-01-16 PROCEDURE — 84550 ASSAY OF BLOOD/URIC ACID: CPT | Performed by: INTERNAL MEDICINE

## 2020-01-16 PROCEDURE — 99221 1ST HOSP IP/OBS SF/LOW 40: CPT | Performed by: SURGERY

## 2020-01-16 PROCEDURE — 83930 ASSAY OF BLOOD OSMOLALITY: CPT | Performed by: NURSE PRACTITIONER

## 2020-01-16 PROCEDURE — 87086 URINE CULTURE/COLONY COUNT: CPT | Performed by: PHYSICIAN ASSISTANT

## 2020-01-16 PROCEDURE — 85014 HEMATOCRIT: CPT

## 2020-01-16 PROCEDURE — 84295 ASSAY OF SERUM SODIUM: CPT

## 2020-01-16 PROCEDURE — 82330 ASSAY OF CALCIUM: CPT | Performed by: NURSE PRACTITIONER

## 2020-01-16 PROCEDURE — 81001 URINALYSIS AUTO W/SCOPE: CPT | Performed by: PHYSICIAN ASSISTANT

## 2020-01-16 RX ORDER — HEPARIN SODIUM 5000 [USP'U]/ML
5000 INJECTION, SOLUTION INTRAVENOUS; SUBCUTANEOUS EVERY 8 HOURS SCHEDULED
Status: DISCONTINUED | OUTPATIENT
Start: 2020-01-16 | End: 2020-01-17 | Stop reason: HOSPADM

## 2020-01-16 RX ORDER — SODIUM CHLORIDE 9 MG/ML
75 INJECTION, SOLUTION INTRAVENOUS CONTINUOUS
Status: DISCONTINUED | OUTPATIENT
Start: 2020-01-16 | End: 2020-01-17 | Stop reason: HOSPADM

## 2020-01-16 RX ORDER — BISACODYL 10 MG
10 SUPPOSITORY, RECTAL RECTAL DAILY PRN
Status: DISCONTINUED | OUTPATIENT
Start: 2020-01-16 | End: 2020-01-17 | Stop reason: HOSPADM

## 2020-01-16 RX ORDER — CALCIUM CARBONATE 200(500)MG
1000 TABLET,CHEWABLE ORAL 3 TIMES DAILY PRN
Status: DISCONTINUED | OUTPATIENT
Start: 2020-01-16 | End: 2020-01-17 | Stop reason: HOSPADM

## 2020-01-16 RX ORDER — ACETAMINOPHEN 325 MG/1
650 TABLET ORAL EVERY 8 HOURS PRN
Status: DISCONTINUED | OUTPATIENT
Start: 2020-01-16 | End: 2020-01-17 | Stop reason: HOSPADM

## 2020-01-16 RX ORDER — ONDANSETRON 2 MG/ML
4 INJECTION INTRAMUSCULAR; INTRAVENOUS EVERY 6 HOURS PRN
Status: DISCONTINUED | OUTPATIENT
Start: 2020-01-16 | End: 2020-01-17 | Stop reason: HOSPADM

## 2020-01-16 RX ORDER — CARVEDILOL 12.5 MG/1
12.5 TABLET ORAL 2 TIMES DAILY WITH MEALS
Status: DISCONTINUED | OUTPATIENT
Start: 2020-01-16 | End: 2020-01-17 | Stop reason: HOSPADM

## 2020-01-16 RX ORDER — ASPIRIN 81 MG/1
81 TABLET ORAL DAILY
COMMUNITY

## 2020-01-16 RX ORDER — DOCUSATE SODIUM 100 MG/1
100 CAPSULE, LIQUID FILLED ORAL 2 TIMES DAILY
Status: DISCONTINUED | OUTPATIENT
Start: 2020-01-16 | End: 2020-01-17 | Stop reason: HOSPADM

## 2020-01-16 RX ORDER — LEVOTHYROXINE SODIUM 0.03 MG/1
25 TABLET ORAL
Status: DISCONTINUED | OUTPATIENT
Start: 2020-01-17 | End: 2020-01-17 | Stop reason: HOSPADM

## 2020-01-16 RX ADMIN — SODIUM CHLORIDE 75 ML/HR: 0.9 INJECTION, SOLUTION INTRAVENOUS at 20:21

## 2020-01-16 RX ADMIN — SODIUM CHLORIDE 100 ML/HR: 0.9 INJECTION, SOLUTION INTRAVENOUS at 05:51

## 2020-01-16 RX ADMIN — DOCUSATE SODIUM 100 MG: 100 CAPSULE, LIQUID FILLED ORAL at 18:30

## 2020-01-16 RX ADMIN — CEFTRIAXONE 1000 MG: 1 INJECTION, POWDER, FOR SOLUTION INTRAMUSCULAR; INTRAVENOUS at 05:50

## 2020-01-16 RX ADMIN — BISACODYL 10 MG: 5 TABLET, COATED ORAL at 05:48

## 2020-01-16 RX ADMIN — INSULIN LISPRO 1 UNITS: 100 INJECTION, SOLUTION INTRAVENOUS; SUBCUTANEOUS at 12:11

## 2020-01-16 RX ADMIN — HEPARIN SODIUM 5000 UNITS: 5000 INJECTION INTRAVENOUS; SUBCUTANEOUS at 23:00

## 2020-01-16 RX ADMIN — DOCUSATE SODIUM 100 MG: 100 CAPSULE, LIQUID FILLED ORAL at 08:54

## 2020-01-16 RX ADMIN — CARVEDILOL 12.5 MG: 12.5 TABLET, FILM COATED ORAL at 16:45

## 2020-01-16 RX ADMIN — HEPARIN SODIUM 5000 UNITS: 5000 INJECTION INTRAVENOUS; SUBCUTANEOUS at 06:01

## 2020-01-16 RX ADMIN — HEPARIN SODIUM 5000 UNITS: 5000 INJECTION INTRAVENOUS; SUBCUTANEOUS at 14:22

## 2020-01-16 RX ADMIN — CARVEDILOL 12.5 MG: 12.5 TABLET, FILM COATED ORAL at 08:54

## 2020-01-16 NOTE — ASSESSMENT & PLAN NOTE
Severe leukocytosis with WBC >400, rule out malignancy  No anemia, Plt slightly decreased 140  Monitor CBC  Hematology consult

## 2020-01-16 NOTE — PHYSICAL THERAPY NOTE
Physical Therapy Evaluation      Patient Active Problem List   Diagnosis    Essential hypertension    Type 2 diabetes mellitus without complication, without long-term current use of insulin ([facility])    Muscular deconditioning    Acute hyperkalemia    Leukocytosis    Tachycardia    Hyponatremia    Acute cystitis without hematuria    Transaminitis    CKD (chronic kidney disease) stage 3, GFR 30-59 ml/min ([facility])    Hypocalcemia    Abnormal findings on diagnostic imaging of gallbladder    Endometrial hyperplasia    Failure to thrive in adult       Past Medical History:   Diagnosis Date    Constipation     Diabetes mellitus ([address] )     Hemorrhoids     Hypertension        Past Surgical History:   Procedure Laterality Date    HEMORROIDECTOMY      TUBAL LIGATION Bilateral       01/16/20 1015   Note Type   Note type Eval only   Pain Assessment   Pain Assessment FLACC   Pain Rating: FLACC (Rest) - Face 0   Pain Rating: FLACC (Rest) - Legs 0   Pain Rating: FLACC (Rest) - Activity 0   Pain Rating: FLACC (Rest) - Cry 1   Pain Rating: FLACC (Rest) - Consolability 1   Score: FLACC (Rest) 2   Pain Rating: FLACC (Activity) - Face 0   Pain Rating: FLACC (Activity) - Legs 0   Pain Rating: FLACC (Activity) - Activity 0   Pain Rating: FLACC (Activity) - Cry 1   Pain Rating: FLACC (Activity) - Consolability 1   Score: FLACC (Activity) 2   Home Living   Type of Home House   Home Layout Two level; Able to live on main level with bedroom/bathroom  (no stairs  first floor setup)   [address]; Wheelchair-manual  (rollator)   Prior Function   Level of [place] Needs assistance with IADLs; Needs assistance with ADLs and functional mobility   Lives With Family; Son   Receives Help From Family   ADL Assistance Needs assistance   IADLs Needs assistance   Falls in the last 6 months 0   Comments pt lives with son   able to dress self, need assist bathing (sponge)  able to amb indep using rollator rw  pt speaks Vietnamese, vision and hearing deficits, unable to use phone  does speak some english  24/7 supervision   Restrictions/Precautions   Weight Bearing Precautions Per Order Yes   Other Precautions Multiple lines;Telemetry; Fall Risk;O2;Pain;Hard of hearing;Visual impairment;Cognitive   Cognition   Overall Cognitive Status Impaired   Orientation Level Oriented to person;Oriented to place   RUE Assessment   RUE Assessment WFL   LUE Assessment   LUE Assessment WFL   RLE Assessment   RLE Assessment X  (str 4/5)   LLE Assessment   LLE Assessment X  (str 4/5)   Coordination   Movements are Fluid and Coordinated 1   Sensation WFL   Bed Mobility   Supine to Sit 3  Moderate assistance   Additional items Assist x 1; Increased time required;Verbal cues;LE management;Assist x 2   Transfers   Sit to Stand 3  Moderate assistance   Additional items Assist x 2; Increased time required;Verbal cues   Stand pivot 3  Moderate assistance   Additional items Assist x 1; Increased time required;Verbal cues;Armrests   Ambulation/Elevation   Gait pattern Forward Flexion;Decreased foot clearance;Shuffling; Short stride; Inconsistent sherice; Step to;Excessively slow   Gait Assistance 3  Moderate assist   Additional items Assist x 2;Verbal cues; Tactile cues   Assistive Device Other (Comment)  (arm in arm 2 persons)   Distance 3' lateral steps   Balance   Static Sitting Fair   Dynamic Sitting Fair -   Static Standing Poor +   Dynamic Standing Poor +   Ambulatory Poor   Endurance Deficit   Endurance Deficit Yes   Endurance Deficit Description weakness   Activity Tolerance   Activity Tolerance Patient tolerated treatment well;Treatment limited secondary to medical complications (Comment)   Medical Staff Made Aware OT   Nurse Made Aware yes   Assessment   Prognosis Fair   Problem List Decreased strength;Decreased endurance; Impaired balance;Decreased mobility; Decreased cognition; Impaired judgement;Decreased safety awareness; Impaired vision; Impaired hearing;Obesity; Decreased skin integrity;Pain  (? abdominal pain, pt would not say)   Assessment pt admitted with abdominal distention and dx with abnormal gall bladder, tachycardia, transaminities and ascites, bruising, possible uti, uterine thickening  pt referred to PT  pt was living wtih son, using rw for short distance amb to bathroom, able to dress self but needing assist bathing  pt is Hopi, reports decreased vision and speaks mostly Sami  pt demonstrated moderate to severe functional limitations due to recent illness and immobility at home  pt was able to mobilize in bed and OOB to chair, amb 3' lateral steps with mod assist of 2  pt did not use rw this session  pt has current deficits in strength, balance, gait sequencing and stability, posture, self care, vision and hearing  pt will need skilled PT to regain prior functional level  will likely need rehab  will assess progress while here   Barriers to Discharge None   Goals   Patient Goals none offered   STG Expiration Date 01/30/20   Short Term Goal #1 supervision with bed mobility, transfers, amb using rollator rw for 25-50'  improve strength and balance by 1/2 grade to enable tp to perform dressing and household amb  Plan   Treatment/Interventions Functional transfer training;LE strengthening/ROM; Therapeutic exercise; Endurance training;Cognitive reorientation;Patient/family training;Equipment eval/education; Bed mobility;Gait training;Spoke to nursing;Family;OT   PT Frequency   (3-5x/week)   Recommendation   Recommendation Post acute IP rehab   Equipment Recommended Walker   PT - OK to Discharge Yes   Modified [place] Scale   Modified David Scale 4   Barthel Index   Feeding 10   Bathing 0   Grooming Score 5   Dressing Score 0   Bladder Score 5   Bowels Score 10   Toilet Use Score 5   Transfers (Bed/Chair) Score 5   Mobility (Level Surface) Score 0   Stairs Score 0   Barthel Index Score 40 History: co - morbidities, fall risk, use of assistive device, assist for adl's, cognition, multiple lines  Exam: impairments in locomotion, musculoskeletal, balance,posture, joint integrity, skin integrity,  cognition   Clinical: unstable/unpredictable  Complexity:high Raul Steve PT

## 2020-01-16 NOTE — PLAN OF CARE
Problem: OCCUPATIONAL THERAPY ADULT  Goal: Performs self-care activities at highest level of function for planned discharge setting  See evaluation for individualized goals  Description  Treatment Interventions: ADL retraining, UE strengthening/ROM, Functional transfer training, Cognitive reorientation, Endurance training, Patient/family training, Equipment evaluation/education, Compensatory technique education, Activityengagement, Energy conservation          See flowsheet documentation for full assessment, interventions and recommendations  Note:   Limitation: Decreased ADL status, Decreased UE strength, Decreased Safe judgement during ADL, Decreased cognition, Decreased endurance, Decreased self-care trans, Decreased high-level ADLs  Prognosis: Good  Assessment: Pt is a 80 y o  female seen for OT evaluation s/p admit to [facility] 1/16/2020 w/ abdominal pain and distension  [facility]   Comorbidities affecting pt's functional performance at time of assessment include: DM II, tachycardia, CKD III, hypocalemia, failure to thrive in adult, acute cystitis w/o  Hematuria, transaminitis, acute hyperkalemia  CT: Cholelithiasis with mild pericholecystic fluid   Personal factors affecting pt at time of IE include:poor historian, hard of hearing (pt son reports is new from past few weeks), Ecuadorean speaking only  Prior to admission, pt was living w/ son who provides 24/7 care/support and son reports pt is independent w/ dressing, assist w/ sponge bathing, supervision short distance mobility to bathroom w/ rollator, assist w/ w/c management (mainly in w/c or bed t/o the day), assist w/ IADLs    Upon evaluation: Pt requires MOD assist x2 supine>sit bed mobility w/ increased time to complete, MOD assist x2 sit<>Stand w/ VCs for hand placement and positioning, MOD assist x2 SPT to recliner chair, MOD-MAX assist LB ADLS, MIN assist UB ADLs, MOD assist toileting 2* the following deficits impacting occupational performance: decreased strength and endurance, impaired balance, impaired activity tolerance, increased pain, impaired activity tolerance, multiple lines, hard of hearing, language barrier, decreased insight and safety support, requires emotional support t/o session  Pt to benefit from continued skilled OT tx while in the hospital to address deficits as defined above and maximize level of functional independence w ADL's and functional mobility  Occupational Performance areas to address include: grooming, bathing/shower, toilet hygiene, dressing, health maintenance, functional mobility and clothing management, formal cognitive assessment, UE exercises  Pt in recliners w/ LEs elevated and all needs met, explained in Swiss to use call bell for assistance  From OT standpoint, recommendation at time of d/c would be short term rehab        OT Discharge Recommendation: Short Term Rehab

## 2020-01-16 NOTE — ED NOTES
This nurse receives  reports from the Lab that her potassium is greater than 14   reports she is calling her supervisor        Sathish Obrien RN  01/15/20 9627

## 2020-01-16 NOTE — ED NOTES
Patient resting quietly - awakened easily  Patient asking if everything is okay and wanted to know what her sugar was on her blood work  Informed patient that it was 177 and she said oh good - that's okay        Aakash Lui RN  01/16/20 [id number]

## 2020-01-16 NOTE — ASSESSMENT & PLAN NOTE
Creatinine 0 9, no baseline on file  Monitor intake and output  Avoid hypotension and nephrotoxins  Monitor serum BMP

## 2020-01-16 NOTE — CONSULTS
Inpatient Consultation - Martha Schultz    Patient Information: Rahul Reyna 80 y o  female MRN: [id number]  Unit/Bed#: ICU 05 Encounter: [id number]  PCP: Bert Friedman MD  Date of Admission:  1/16/2020  Date of Consultation: 01/16/20  Requesting Physician: Becky Sánchez DO    Reason For Consultation:  Abdominal pain and Cholithiasis on Abdominal/Pelvis CT scan    Assessment/Plan:      1) Cholilithiasis on diagnostic Imaging on Gallbladder          Generalized abdominal pain , afebrile, WBC >100K           Negative pierre sign          CT  Showed Cholelithiasis with mild pericholecystic fluid   Findings are equivocal for cholecystitis                   -NPO        -Continue IV fluids       - RUQ ultrasound ordered, if consistent with Acute cholecystitis, will         perform IR Percutaneous cholecystectomy since patient is not a surgical candidate due to age and chronic comorbidities  2) Transaminitis        AST/ALT:154/152, ALK Phos: 188, T bili: 2 1        Possible cholestatic etiology        CT ABD/Pelvis: Liver was unremarkable, mild ascites and splenomegaly                -RUQ US ordered for more evaluation         -Acute Hep panel pending       3) Chronic constipation: Patient was on Metamucil for 7 years but discontinued 1 month ago due to abdominal pain with PO intake  She tried milk of magnesia 2 week but still no improvement  - Continue stool softener with dulcolax       - Consider adding bowel regimen     4) Acute cystitis- wbc 10-20, + leukocytosis    Patient is asymptomatic        - Urine culture pending      - Currently on Rocephin         5) Leukocytosis-  WBC>100         -Rule out malignancy        -Heme/onc on board    6) Hyperkalemia- K>10 Likely pseudohyperkalemia       Being managed by Nephrology/primary team                 History of Present Illness:    Rahul Reyna is a 80 y o  female who is originally admitted to the ICU  service on 1/16/2020 due to abdominal pain and distension  We are consulted for abnormal diagnostic imaging of Gallbladder    79 y/o F w/ PMH of HTN/ DM/ CKD/ Chronic constipation presents to the ED yesterday for abdominal pain and distention  Patient was transferred to the ICU from [facility]  Patient reports that she was at her baseline until a month ago when she started having generalized abdominal pain  She states that her symptoms is associated with nausea, dry heaves, poor appetite and decreased PO intake   She states that her abdominal pain is worsened with any po intake  She denies any fever, chills, chest pain, SOB,  vomiting, and urinary complaints  Patient reports that she has chronic constipation that was managed with metamucil  She reports that she stopped taking metamucil a month ago since she could not tolerate anything PO  Since then she has been very hard stools  She states that she tried taking milk of magnesia for 2 weeks without any relieve  She reports her last bowel movement was 3 days ago and she is passing flatus  Of note patient had a normal screening colonoscopy back in 2010  Patient was examined today, she  reports that her abdominal pain is improving  Review of Systems:    Review of Systems   Constitutional: Positive for appetite change and fatigue  Negative for chills and fever  Respiratory: Negative for shortness of breath and wheezing  Cardiovascular: Negative for chest pain and leg swelling  Gastrointestinal: Positive for abdominal distention, abdominal pain, constipation and nausea  Negative for blood in stool, diarrhea and vomiting  Generalized   Genitourinary: Negative for difficulty urinating, dysuria, frequency and hematuria  Neurological: Negative for light-headedness, numbness and headaches         Past Medical and Surgical History:   Past Medical History:   Diagnosis Date    Constipation     Diabetes mellitus ([address] )     Hemorrhoids     Hypertension      Past Surgical History:   Procedure Laterality Date    HEMORROIDECTOMY      TUBAL LIGATION Bilateral      Meds/Allergies: Allergies: Allergies   Allergen Reactions    Lisinopril Cough    No Active Allergies      Prior to Admission Medications   Prescriptions Last Dose Informant Patient Reported? Taking?    FREESTYLE UNISTICK II LANCETS MISC   No No   Sig: Inject under the skin 2 (two) times a day Check blood sugar   aspirin (ECOTRIN LOW STRENGTH) 81 mg EC tablet   Yes Yes   Sig: Take 81 mg by mouth daily   carvedilol (COREG) 12 5 mg tablet   No No   Sig: Take 1 tablet (12 5 mg total) by mouth 2 (two) times a day with meals   glucose blood (FREESTYLE LITE) test strip   No No   Si each by Other route 2 (two) times a day Use as instructed   lisinopril (ZESTRIL) 5 mg tablet   No No   Sig: Take 1 tablet (5 mg total) by mouth daily   Patient not taking: Reported on 2020   metFORMIN (GLUCOPHAGE) 500 mg tablet   No No   Sig: Take 1 tablet (500 mg total) by mouth 2 (two) times a day with meals   pravastatin (PRAVACHOL) 10 mg tablet   No No   Sig: Take 4 tablets (40 mg total) by mouth daily      Facility-Administered Medications: None     Social History:     Social History     Socioeconomic History    Marital status: /Civil Union     Spouse name: Not on file    Number of children: Not on file    Years of education: Not on file    Highest education level: Not on file   Occupational History    Not on file   Social Needs    Financial resource strain: Not on file    Food insecurity:     Worry: Not on file     Inability: Not on file    Transportation needs:     Medical: Not on file     Non-medical: Not on file   Tobacco Use    Smoking status: Never Smoker    Smokeless tobacco: Never Used    Tobacco comment: NO TOBACCO USE   Substance and Sexual Activity    Alcohol use: Never     Frequency: Never    Drug use: Never    Sexual activity: Not on file   Lifestyle    Physical activity:     Days per week: Not on file Minutes per session: Not on file    Stress: Not on file   Relationships    Social connections:     Talks on phone: Not on file     Gets together: Not on file     Attends Protestant service: Not on file     Active member of club or organization: Not on file     Attends meetings of clubs or organizations: Not on file     Relationship status: Not on file    Intimate partner violence:     Fear of current or ex partner: Not on file     Emotionally abused: Not on file     Physically abused: Not on file     Forced sexual activity: Not on file   Other Topics Concern    Not on file   Social History Narrative    Not on file     Family History:  Family History   Problem Relation Age of Onset    Coronary artery disease Mother            Vitals:   Blood Pressure: 126/69 (01/16/20 0643)  Pulse: 104 (01/16/20 0643)  Temperature: 97 6 °F (36 4 °C) (01/16/20 0700)  Temp Source: Temporal (01/16/20 0700)  Respirations: (!) 25 (01/16/20 0643)  Height: 5' 6" (167 6 cm) (01/16/20 0353)  Weight - Scale: 98 4 kg (216 lb 14 9 oz) (01/16/20 0353)  SpO2: 94 % (01/16/20 0643)    Physical Exam   Constitutional: She appears well-developed and well-nourished  No distress  HENT:   Head: Normocephalic  Eyes: Pupils are equal, round, and reactive to light  No scleral icterus  Cardiovascular: Regular rhythm  Exam reveals no gallop and no friction rub  No murmur heard  Pulmonary/Chest: Effort normal  No respiratory distress  Abdominal: Soft  Bowel sounds are normal  She exhibits distension  There is no tenderness  There is no rebound and no guarding    -Obese body habitus  --No guarding or rebound tenderness  -Negative pierre     Musculoskeletal: Normal range of motion  Neurological: She is alert  Skin: Skin is warm  Capillary refill takes less than 2 seconds  Additional Data:     Lab Results: I have personally reviewed pertinent reports        Results from last 7 days   Lab Units 01/16/20  5374 01/15/20  9528   WBC Thousand/uL  --  >400 00*   HEMOGLOBIN g/dL  --  12 2   I STAT HEMOGLOBIN g/dl 16 3*  --    HEMATOCRIT %  --  38 6   HEMATOCRIT, ISTAT % 48*  --    PLATELETS Thousands/uL  --  140*   LYMPHO PCT %  --  70*   MONO PCT %  --  3     Results from last 7 days   Lab Units 01/16/20  0444 01/15/20  2217   POTASSIUM mmol/L  --  >10 0*   CHLORIDE mmol/L  --  98   CO2 mmol/L  --  24   CO2, I-STAT mmol/L 26  --    BUN mg/dL  --  12   CREATININE mg/dL  --  0 96   CALCIUM mg/dL  --  7 9*   ALK PHOS U/L  --  188*   ALT U/L  --  152*   AST U/L  --  154*   GLUCOSE, ISTAT mg/dl 179*  --      Results from last 7 days   Lab Units 01/15/20  2217   INR  1 20*       Imaging: I have personally reviewed pertinent reports  Ct Abdomen Pelvis With Contrast    Result Date: 1/15/2020  Narrative: CT ABDOMEN AND PELVIS WITH IV CONTRAST INDICATION:   Abdominal distension  COMPARISON:  None  TECHNIQUE:  CT examination of the abdomen and pelvis was performed  Axial, sagittal, and coronal 2D reformatted images were created from the source data and submitted for interpretation  Radiation dose length product (DLP) for this visit:  929 mGy-cm   This examination, like all CT scans performed in the [facility], was performed utilizing techniques to minimize radiation dose exposure, including the use of iterative reconstruction and automated exposure control  IV Contrast:  100 mL of iodixanol (VISIPAQUE) Enteric Contrast:  Enteric contrast was not administered  FINDINGS: ABDOMEN LOWER CHEST:  No clinically significant abnormality identified in the visualized lower chest  LIVER/BILIARY TREE:  Unremarkable  GALLBLADDER:  There are gallstone(s) within the gallbladder  There is mild pericholecystic fluid  SPLEEN:  The spleen is enlarged, measuring 14 7 cm PANCREAS:  Unremarkable  ADRENAL GLANDS:  Unremarkable  KIDNEYS/URETERS:  Unremarkable  No hydronephrosis  STOMACH AND BOWEL:  Unremarkable   APPENDIX:  No findings to suggest appendicitis  ABDOMINOPELVIC CAVITY:  Numerous prominent mesenteric nodes    No lymphadenopathy  VESSELS:  Unremarkable for patient's age  PELVIS REPRODUCTIVE ORGANS:  Endometrial thickening measuring up to 14 mm  URINARY BLADDER:  Unremarkable  ABDOMINAL WALL/INGUINAL REGIONS:  Unremarkable  OSSEOUS STRUCTURES:  No acute fracture or destructive osseous lesion  Impression: Cholelithiasis with mild pericholecystic fluid  Findings are equivocal for cholecystitis and gallbladder ultrasound is recommended  Mild ascites and splenomegaly Endometrial thickness measuring up to 14 mm  Nonemergent pelvic ultrasound recommended for further evaluation  The study was marked in EPIC for significant notification   Workstation performed: Andrey Yates MD  01/16/20  8:51 AM

## 2020-01-16 NOTE — ED NOTES
Patient awake and alert upon transfer - joking with EMS staff  Patient moved herself over onto EMS litter       Stephanie Matamoros RN  01/16/20 0062

## 2020-01-16 NOTE — CONSULTS
Patient MRN: [id number]  Date of Service: 1/16/2020  Referring Physician: Dr Mikayla Sam  Provider Creating Note: ERIC Alaniz  PCP: Barrie Orourke  Reason for Consult:  Transaminitis  HPI  Israel Valdivia is a 80 y o  female who was admitted with Acute hyperkalemia  She was a transfer from [address] [place] [address] with a complaint of abdominal pain and distention  According to the notes patient had reported abdominal distention for 1-1/2 months  She has a history of constipation and previously been on Metamucil for many years and thought this might be contributing to her abdominal pain so she stopped it  After a few days she felt constipated  According to note she did have a small bowel movement  She also reported loss of appetite nausea and intermittent emesis with mucus  Patient thinks that she may have lost some weight  She had a CT scan of the abdomen and pelvis which showed gallstones within the gallbladder and mild pericholecystic fluid  She was also noted to have numerous prominent mesenteric nodes, endometrial thickening, mild ascites and splenomegaly  Blood work shows WBCs greater than 400,000 as well as elevated AST at 1:54 a m , ALT of 152, ALP at 188 total bilirubin 2 1, platelets 238 and INR 1 20  At this time the abdominal pain seems to have improved  According to nursing she has not complained of abdominal pain this morning  Potassium was also significantly elevated at 10  There were no previous liver enzymes to compare  Past Medical History:   Diagnosis Date    Constipation     Diabetes mellitus ([address] )     Hemorrhoids     Hypertension      Past Surgical History:   Procedure Laterality Date    HEMORROIDECTOMY      TUBAL LIGATION Bilateral      Medications  Home Medications:   Prior to Admission medications    Medication Sig Start Date End Date Taking?  Authorizing Provider   aspirin (ECOTRIN LOW STRENGTH) 81 mg EC tablet Take 81 mg by mouth daily   Yes Historical Provider, MD carvedilol (COREG) 12 5 mg tablet Take 1 tablet (12 5 mg total) by mouth 2 (two) times a day with meals 9/8/19   Maxim Ramirez MD   FREESTYLE UNISTICK II LANCETS MISC Inject under the skin 2 (two) times a day Check blood sugar 9/8/19   Maxim Ramirez MD   glucose blood (FREESTYLE LITE) test strip 1 each by Other route 2 (two) times a day Use as instructed 9/8/19   Maxim Ramirez MD   lisinopril (ZESTRIL) 5 mg tablet Take 1 tablet (5 mg total) by mouth daily  Patient not taking: Reported on 1/16/2020 9/8/19   Maxim Ramirez MD   metFORMIN (GLUCOPHAGE) 500 mg tablet Take 1 tablet (500 mg total) by mouth 2 (two) times a day with meals 9/8/19   Maxim Ramirez MD   pravastatin (PRAVACHOL) 10 mg tablet Take 4 tablets (40 mg total) by mouth daily 9/8/19   Maxim Ramirez MD       Inhouse Medications    Current Facility-Administered Medications:     acetaminophen (TYLENOL) tablet 650 mg, 650 mg, Oral, Q8H PRN    bisacodyl (DULCOLAX) rectal suppository 10 mg, 10 mg, Rectal, Daily PRN    calcium carbonate (TUMS) chewable tablet 1,000 mg, 1,000 mg, Oral, TID PRN    carvedilol (COREG) tablet 12 5 mg, 12 5 mg, Oral, BID With Meals, 12 5 mg at 01/16/20 0854    cefTRIAXone (ROCEPHIN) 1,000 mg in dextrose 5 % 50 mL IVPB, 1,000 mg, Intravenous, Q24H, 1,000 mg at 01/16/20 0550    docusate sodium (COLACE) capsule 100 mg, 100 mg, Oral, BID, 100 mg at 01/16/20 0854    heparin (porcine) subcutaneous injection 5,000 Units, 5,000 Units, Subcutaneous, Q8H [address], 5,000 Units at 01/16/20 0601 **AND** [CANCELED] Platelet count, , , Once    insulin lispro (HumaLOG) 100 units/mL subcutaneous injection 1-6 Units, 1-6 Units, Subcutaneous, Q6H [address] **AND** Fingerstick Glucose (POCT), , , Q6H    ondansetron (ZOFRAN) injection 4 mg, 4 mg, Intravenous, Q6H PRN    sodium chloride 0 9 % infusion, 100 mL/hr, Intravenous, Continuous, 100 mL/hr at 01/16/20 0592    Allergies  Allergies   Allergen Reactions    Lisinopril Cough    No Active Allergies      Social History   reports that she has never smoked  She has never used smokeless tobacco  She reports that she does not drink alcohol or use drugs  Family History  Family History   Problem Relation Age of Onset    Coronary artery disease Mother      ROS  ROS: Denies CP, SOB, fever  Positive generalized abdominal pain, weight loss, constipation, nausea and vomiting  All others negative except as noted in HPI  Objective   Vitals  Blood pressure 169/77, pulse (!) 111, temperature 97 6 °F (36 4 °C), temperature source Temporal, resp  rate (!) 42, height 5' 6" (1 676 m), weight 98 4 kg (216 lb 14 9 oz), SpO2 97 %  General: Alert, no apparent distress  Eyes: No scleral icterus  ENT: MMM  Card: RRR no murmur  Lungs: Clear to ascultation b/l  No wheezes, rales, rhonchi  Abdomen: Soft  Nontender  Nondistended  Bowel sounds present and normoactive  Skin: No jaundice  Neuro: Alert and oriented x3        Laboratory Studies  Lab Results   Component Value Date    WBC >400 00 (HH) 01/15/2020    HGB 16 3 (H) 01/16/2020    HCT 48 (H) 01/16/2020     (L) 01/15/2020    MCV 98 01/15/2020     Lab Results   Component Value Date    CREATININE 0 96 01/15/2020    BUN 12 01/15/2020    SODIUM 125 (L) 01/15/2020    K >10 0 (HH) 01/15/2020    CL 98 01/15/2020    CO2 26 01/16/2020    GLUCOSE 179 (H) 01/16/2020    CALCIUM 7 9 (L) 01/15/2020    ALKPHOS 188 (H) 01/15/2020     (H) 01/15/2020     (H) 01/15/2020     Lab Results   Component Value Date    PROTIME 13 3 (H) 01/15/2020    INR 1 20 (H) 01/15/2020       Imaging and Other Studies      Assessment and Plan:  1  Transaminitis with CT showing cholelithiasis and mild pericolic cystic fluid findings are equivocal for cholecystitis  Ultrasound is pending  Check LFTs in a m  Hepatitis A B and C panel is pending  Surgery has been consulted  2   Severe leukocytosis, questionable malignancy, CT did show prominent mesenteric nodes and mild ascites with endometrial thickening  Hematology consult  Gyn consult being considered  3   Hyponatremia primary following    Principal Problem:    Acute hyperkalemia  Active Problems:    Essential hypertension    Type 2 diabetes mellitus without complication, without long-term current use of insulin (HCC)    Leukocytosis    Tachycardia    Hyponatremia    Acute cystitis without hematuria    Transaminitis    CKD (chronic kidney disease) stage 3, GFR 30-59 ml/min ([facility])    Hypocalcemia    Abnormal findings on diagnostic imaging of gallbladder    Endometrial hyperplasia    Failure to thrive in adult      Esteban ERIC Barrios

## 2020-01-16 NOTE — ED NOTES
Patient was being interviewed by NP - with help of Montenegrin interpretor - according to son who was also interviewed, patient stopped taking metamucil 5 days ago after having taken it for the past 5 years  He also states that her abdomen has been large for a long time and that she has a ?ball on the left side of her abdomen  Unsure of last bowel movement but he states for the last week or two she has only been taking sips of hot soup and liquids and has not been eating  Patient's abdomen is large but soft - seems tender both lower quadrants and bowel sounds relatively quiet at this time  He states that she has been vomiting phlegm  Patient is awake and alert - asks appropriate questions - "do you speak Montenegrin? She follows instructions - she asked to urinate and asked her son to make sure that he closed the curtain  When her underwear was left off, patient requested that it be put back on because she does not like it off  She is smiling and laughing at times as well       Tanesha Hu RN  01/16/20 8196

## 2020-01-16 NOTE — ASSESSMENT & PLAN NOTE
AST/, 152, Alk P 188, T Bili 2 10  · CT:  Liver unremarkable; mild ascites and splenomegaly  Cholelithiasis, mild pericholecystic fluid    RUQ U/S ordered  · Hold statin  · Avoid hepatotoxins  · Acute hepatitis panel ordered  · GI consult

## 2020-01-16 NOTE — ASSESSMENT & PLAN NOTE
Severe leukocytosis with WBC >400, rule out malignancy  No anemia, Plt slightly decreased 140  · Monitor CBC  · Hematology consult

## 2020-01-16 NOTE — ASSESSMENT & PLAN NOTE
· A1c ordered  · Hold oral hypoglycemics while inpatient,start Accu-Cheks and sliding scale  · ADA diet when diet advanced    No results found for: HGBA1C

## 2020-01-16 NOTE — ED PROVIDER NOTES
00:15 The patient was evaluated in the ED by the critical care advanced practitioner  She discussed the case with her attending Dr Deejay Eli and recommended transfer to the [facility] (stepdown level II) for further management  Transfer request entered  Will continue to monitor in the ED while awaiting transport       Sheron Rivas MD  01/16/20 Caryn Lau

## 2020-01-16 NOTE — ASSESSMENT & PLAN NOTE
CT: Cholelithiasis with mild pericholecystic fluid   Findings are equivocal for cholecystitis and gallbladder ultrasound is recommended  Denies RUQ pain, afebrile  No leukocytosis, negative Morales's sign   Will hold antibiotics pending result of U/S  NPO, IV hydration  RUQ U/S ordered  Surgery consult  GI consult

## 2020-01-16 NOTE — ED NOTES
Patient noted to be sleeping and resting quietly with son present in room       Oliver Ceballos RN  01/16/20 6686

## 2020-01-16 NOTE — PLAN OF CARE
Problem: Potential for Falls  Goal: Patient will remain free of falls  Description  INTERVENTIONS:  - Assess patient frequently for physical needs  -  Identify cognitive and physical deficits and behaviors that affect risk of falls    -  [place] fall precautions as indicated by assessment   - Educate patient/family on patient safety including physical limitations  - Instruct patient to call for assistance with activity based on assessment  - Modify environment to reduce risk of injury  - Consider OT/PT consult to assist with strengthening/mobility  Outcome: Progressing     Problem: Prexisting or High Potential for Compromised Skin Integrity  Goal: Skin integrity is maintained or improved  Description  INTERVENTIONS:  - Identify patients at risk for skin breakdown  - Assess and monitor skin integrity  - Assess and monitor nutrition and hydration status  - Monitor labs   - Assess for incontinence   - Turn and reposition patient  - Assist with mobility/ambulation  - Relieve pressure over bony prominences  - Avoid friction and shearing  - Provide appropriate hygiene as needed including keeping skin clean and dry  - Evaluate need for skin moisturizer/barrier cream  - Collaborate with interdisciplinary team   - Patient/family teaching  - Consider wound care consult   Outcome: Progressing

## 2020-01-16 NOTE — ASSESSMENT & PLAN NOTE
K >10; discussed with ICU attending and thought to be pseudohyperkalemia secondary to WBC lysis  EKG negative for peaked T-waves, QT or ST changes   · i-Stat requested  · IV hydration  · Monitor serum K

## 2020-01-16 NOTE — ED NOTES
IV site infiltrated in cat scan - new site started with some difficulty  Noted bruising on abdomen and arms and inner thighs which son states is relatively new and that he did make her family doctor aware  He states she takes aspirin every day  All peripheral pulses are palpable, no pedal edema, ?crackles at left base with no apparent respiratory difficulty  Oxygen saturations noted to be 88 - 94% on room air - oxygen applied at 2l  Abdomen is large, round, soft with relatively quiet bowel sounds at this time with no vomiting noted  Patient is pleasant and follows instructions       Daya العراقي RN  01/16/20 6740

## 2020-01-16 NOTE — ED NOTES
Laboratory talked with this nurse that the blood work collected was "innacurate"  Provider made aware and blood is being re drawn        Diann Sy RN  01/15/20 0852

## 2020-01-16 NOTE — H&P
H&P- Lady Pond Eddy 1933, 80 y o  female MRN: [id number]    Unit/Bed#: ICU 05 Encounter: [id number]    Primary Care Provider: Olegario Iqbal MD   Date and time admitted to hospital: 1/16/2020  3:35 AM      Leukocytosis  Assessment & Plan  Severe leukocytosis with WBC >400, rule out malignancy  No anemia, Plt slightly decreased 140  · Monitor CBC  · Hematology consult    * Acute hyperkalemia  Assessment & Plan  K >10; discussed with ICU attending and thought to be pseudohyperkalemia secondary to WBC lysis  EKG  ordered, monitor for peaked T-waves, QT or ST changes, i-stat requested  · i-Stat requested  · IV hydration  · Monitor serum K  · Continuous cardiac monitor    Abnormal findings on diagnostic imaging of gallbladder  Assessment & Plan  CT: Cholelithiasis with mild pericholecystic fluid   Findings are equivocal for cholecystitis and gallbladder ultrasound is recommended  Denies RUQ pain, afebrile  No leukocytosis, negative Morales's sign  Will hold antibiotics pending result of U/S  NPO, IV hydration  RUQ U/S ordered  Surgery consult  GI consult    Transaminitis  Assessment & Plan  AST/, 152, Alk P 188, T Bili 2 10  · CT:  Liver unremarkable; mild ascites and splenomegaly  Cholelithiasis, mild pericholecystic fluid    RUQ U/S ordered  · Hold statin  · Avoid hepatotoxins  · Acute hepatitis panel ordered  · GI consult    Acute cystitis without hematuria  Assessment & Plan  UA WBC 10-20, +leuks, neg nitrite  Urine culture and blood culture in process  Will start IV Rocephin    Hyponatremia  Assessment & Plan  Na 125, when corrected for hyperglycemia, Na 126  · Will hydrate with 0 9NS  · Obtain urine osmolality, urine Na and serum osmolality  · 1500 mL fluid restriction  · Monitor serum Na  · Nephrology consult    Failure to thrive in adult  Assessment & Plan  Reports loss of appetite and weakness  PT OT consult  Nutrition consult    Endometrial hyperplasia  Assessment & Plan  CT shows endometrial thickness measuring 14mm; pelvic ultrasound recommended  Pelvic U/S ordered  Consider inpatient GYN consult    Hypocalcemia  Assessment & Plan  Ca 7 9, corrected calcium for hypoalbuminemia 8 2  Ionized Ca and Vit D level ordered  Monitor hypocalcemia while on IV hydration    CKD (chronic kidney disease) stage 3, GFR 30-59 ml/min ([facility])  Assessment & Plan  Creatinine 0 9, no baseline on file  Monitor intake and output  Avoid hypotension and nephrotoxins  Monitor serum BMP    Tachycardia  Assessment & Plan  HR>100  Maintained on carvedilol  TSH ordered  Monitor on telemetry  Treat underlying cause    Type 2 diabetes mellitus without complication, without long-term current use of insulin ([facility])  Assessment & Plan  No results found for: HGBA1C    No results for input(s): POCGLU in the last 72 hours  · A1c ordered  · Hold oral hypoglycemics while inpatient,start Accu-Cheks and sliding scale  · ADA diet when diet advanced    Blood Sugar Average: Last 72 hrs:        Essential hypertension  Assessment & Plan  Maintained on carvedilol, continue      VTE Prophylaxis: Heparin  / sequential compression device   Code Status: FC  POLST: POLST is not applicable to this patient  Discussion with family:  Son at bedside    Anticipated Length of Stay:  Patient will be admitted on an Inpatient basis with an anticipated length of stay of  > 2 midnights  Justification for Hospital Stay:  Acute hyperkalemia, leukocytosis severe    Total Time for Visit, including Counseling / Coordination of Care: 1 hour  Greater than 50% of this total time spent on direct patient counseling and coordination of care  Chief Complaint:   Abdominal pain and distension    History of Present Illness:    Bisi Hand is a 80 y o  female who presents as a transfer from [facility] with c/o abdominal pain and distension  Patient very hard of hearing, history obtained with the help of her son who was at the bedside    Patient reports abdominal distension for 1-1/2months, states she has been on Metamucil for 7 years and believes this is contributing to her abdominal pain, constipation and distension, stopped taking Metamucil a few days ago, feels constipated, has flatulence, had small BM yesterday  Reports associated loss of appetite, nausea and emesis described as white mucus  Patient thinks she may have lost some weight, does not weigh self daily  Reports scattered ecchymosis and hematomas beginning this month, stopped taking daily ASA  Reports dizziness and weakness  Denies fever, diaphoresis, night sweats, fatigue, dysuria, vaginal bleeding, chest pain or shortness of breath  Patient resides at home with her son, is wheelchair dependent, states she walks to the bathroom only with a Rollator other than that she spends all of her time in bed  Review of Systems:    Review of Systems   Constitutional: Positive for appetite change and unexpected weight change  Negative for diaphoresis and fever  Loss of appetite, weight loss   Respiratory: Negative  Cardiovascular: Negative  Gastrointestinal: Positive for abdominal pain, constipation, nausea and vomiting  Negative for diarrhea  Genitourinary: Negative for urgency and vaginal bleeding  Musculoskeletal: Positive for gait problem  Neurological: Positive for weakness  Past Medical and Surgical History:     Past Medical History:   Diagnosis Date    Constipation     Diabetes mellitus ([facility] [address] )     Hemorrhoids     Hypertension        Past Surgical History:   Procedure Laterality Date    HEMORROIDECTOMY      TUBAL LIGATION Bilateral        Meds/Allergies:    Prior to Admission medications    Medication Sig Start Date End Date Taking?  Authorizing Provider   carvedilol (COREG) 12 5 mg tablet Take 1 tablet (12 5 mg total) by mouth 2 (two) times a day with meals 9/8/19   MD TAMMI Lewis UNISTICK II LANCETS MISC Inject under the skin 2 (two) times a day Check blood sugar 9/8/19   Katrina Hadley MD   glucose blood (FREESTYLE LITE) test strip 1 each by Other route 2 (two) times a day Use as instructed 9/8/19   Katrina Hadley MD   lisinopril (ZESTRIL) 5 mg tablet Take 1 tablet (5 mg total) by mouth daily  Patient not taking: Reported on 1/16/2020 9/8/19   Katrina Hadley MD   metFORMIN (GLUCOPHAGE) 500 mg tablet Take 1 tablet (500 mg total) by mouth 2 (two) times a day with meals 9/8/19   Katrina Hadley MD   pravastatin (PRAVACHOL) 10 mg tablet Take 4 tablets (40 mg total) by mouth daily 9/8/19   Katrina Hadley MD     I have reviewed home medications with patient family member  Allergies: Allergies   Allergen Reactions    Lisinopril Cough    No Active Allergies        Social History:     Marital Status: /Civil Union   Occupation: retired  Patient Pre-hospital Living Situation:  Resides at home with son  Patient Pre-hospital Level of Mobility:  Rollator short distances  Patient Pre-hospital Diet Restrictions:   Substance Use History:   Social History     Substance and Sexual Activity   Alcohol Use Never    Frequency: Never     Social History     Tobacco Use   Smoking Status Never Smoker   Smokeless Tobacco Never Used   Tobacco Comment    NO TOBACCO USE     Social History     Substance and Sexual Activity   Drug Use Never       Family History:    Family History   Problem Relation Age of Onset    Coronary artery disease Mother        Physical Exam:     Vitals:   Blood Pressure: 165/99 (01/16/20 0345)  Pulse: (!) 124 (01/16/20 0345)  Temperature: 98 3 °F (36 8 °C) (01/16/20 0400)  Temp Source: Temporal (01/16/20 0400)  Respirations: (!) 28 (01/16/20 0345)  Height: 5' 6" (167 6 cm) (01/16/20 0353)  Weight - Scale: 98 4 kg (216 lb 14 9 oz) (01/16/20 0353)  SpO2: 97 % (01/16/20 0345)    Physical Exam   Constitutional: She is oriented to person, place, and time  She appears well-developed and well-nourished  No distress     Obese   HENT:   Head: Normocephalic and atraumatic  Very hard of hearing   Eyes: No scleral icterus  Neck: Neck supple  Cardiovascular: Regular rhythm  Tachycardia present  No murmur heard  Pulmonary/Chest: Effort normal and breath sounds normal  No stridor  No respiratory distress  She has no wheezes  She has no rales  Abdominal: Soft  She exhibits distension  She exhibits no mass  There is tenderness  There is no guarding  Obese abdomen, decreased bowel sounds , mild tenderness on palpation of all quadrants  Negative Morales's   Musculoskeletal: She exhibits no edema  Neurological: She is alert and oriented to person, place, and time  Skin: Skin is warm and dry  She is not diaphoretic  No pallor  Ecchymosis scattered B/LLE B/L UE and trunk, large ecchymosis with hematoma at center over LUQ and left flank   Psychiatric: Judgment and thought content normal      Additional Data:     Lab Results: I have personally reviewed pertinent reports  Results from last 7 days   Lab Units 01/16/20  0444 01/15/20  2217   WBC Thousand/uL  --  >400 00*   HEMOGLOBIN g/dL  --  12 2   I STAT HEMOGLOBIN g/dl 16 3*  --    HEMATOCRIT %  --  38 6   HEMATOCRIT, ISTAT % 48*  --    PLATELETS Thousands/uL  --  140*   LYMPHO PCT %  --  70*   MONO PCT %  --  3     Results from last 7 days   Lab Units 01/16/20  0444 01/15/20  2217   SODIUM mmol/L  --  125*   POTASSIUM mmol/L  --  >10 0*   CHLORIDE mmol/L  --  98   CO2 mmol/L  --  24   CO2, I-STAT mmol/L 26  --    BUN mg/dL  --  12   CREATININE mg/dL  --  0 96   ANION GAP mmol/L  --  3*   CALCIUM mg/dL  --  7 9*   ALBUMIN g/dL  --  3 6   TOTAL BILIRUBIN mg/dL  --  2 10*   ALK PHOS U/L  --  188*   ALT U/L  --  152*   AST U/L  --  154*   GLUCOSE RANDOM mg/dL  --  177*     Results from last 7 days   Lab Units 01/15/20  2217   INR  1 20*             Results from last 7 days   Lab Units 01/15/20  2206   LACTIC ACID mmol/L 1 7       Imaging: I have personally reviewed pertinent reports        US right upper quadrant (Results Pending)   US pelvis complete non OB    (Results Pending)       EKG, Pathology, and Other Studies Reviewed on Admission:   CT  Allscripts / Epic Records Reviewed: Yes     ** Please Note: This note has been constructed using a voice recognition system   **

## 2020-01-16 NOTE — EMTALA/ACUTE CARE TRANSFER
[facility] EMERGENCY DEPARTMENT  [address] [facility] [address]  [phone]  Dept: [phone]      EMTALA TRANSFER CONSENT    NAME Jasmyn Martinez Living 1933                              MRN [id number]    I have been informed of my rights regarding examination, treatment, and transfer   by Dr Kayli Vazquez MD    Benefits: Specialized equipment and/or services available at the receiving facility (Include comment)________________________(hematology)    Risks: Potential deterioration of medical condition, Loss of IV, Increased discomfort during transfer, Possible worsening of condition or death during transfer      Consent for Transfer:  I acknowledge that my medical condition has been evaluated and explained to me by the emergency department physician or other qualified medical person and/or my attending physician, who has recommended that I be transferred to the service of    at    The above potential benefits of such transfer, the potential risks associated with such transfer, and the probable risks of not being transferred have been explained to me, and I fully understand them  The doctor has explained that, in my case, the benefits of transfer outweigh the risks  I agree to be transferred  I authorize the performance of emergency medical procedures and treatments upon me in both transit and upon arrival at the receiving facility  Additionally, I authorize the release of any and all medical records to the receiving facility and request they be transported with me, if possible  I understand that the safest mode of transportation during a medical emergency is an ambulance and that the Hospital advocates the use of this mode of transport   Risks of traveling to the receiving facility by car, including absence of medical control, life sustaining equipment, such as oxygen, and medical personnel has been explained to me and I fully understand them  (DARIO CORRECT BOX BELOW)  [  ]  I consent to the stated transfer and to be transported by ambulance/helicopter  [  ]  I consent to the stated transfer, but refuse transportation by ambulance and accept full responsibility for my transportation by car  I understand the risks of non-ambulance transfers and I exonerate the Hospital and its staff from any deterioration in my condition that results from this refusal     X___________________________________________    DATE  20  TIME________  Signature of patient or legally responsible individual signing on patient behalf           RELATIONSHIP TO PATIENT_________________________          Provider Certification    NAME Jasmyn Moody                                         1933                              MRN [id number]    A medical screening exam was performed on the above named patient  Based on the examination:    Condition Necessitating Transfer There were no encounter diagnoses  Patient Condition: The patient has been stabilized such that within reasonable medical probability, no material deterioration of the patient condition or the condition of the unborn child(nabila) is likely to result from the transfer    Reason for Transfer: Level of Care needed not available at this facility    Transfer Requirements: Facility     · Space available and qualified personnel available for treatment as acknowledged by    · Agreed to accept transfer and to provide appropriate medical treatment as acknowledged by          · Appropriate medical records of the examination and treatment of the patient are provided at the time of transfer   [address], [address] _______  · Transfer will be performed by qualified personnel from    and appropriate transfer equipment as required, including the use of necessary and appropriate life support measures      Provider Certification: I have examined the patient and explained the following risks and benefits of being transferred/refusing transfer to the patient/family:         Based on these reasonable risks and benefits to the patient and/or the unborn child(nabila), and based upon the information available at the time of the patients examination, I certify that the medical benefits reasonably to be expected from the provision of appropriate medical treatments at another medical facility outweigh the increasing risks, if any, to the individuals medical condition, and in the case of labor to the unborn child, from effecting the transfer      X____________________________________________ DATE 01/16/20        TIME_______      ORIGINAL - SEND TO MEDICAL RECORDS   COPY - SEND WITH PATIENT DURING TRANSFER

## 2020-01-16 NOTE — ASSESSMENT & PLAN NOTE
No results found for: HGBA1C    No results for input(s): POCGLU in the last 72 hours      · A1c ordered  · Hold oral hypoglycemics while inpatient,start Accu-Cheks and sliding scale  · ADA diet when diet advanced    Blood Sugar Average: Last 72 hrs:

## 2020-01-16 NOTE — ASSESSMENT & PLAN NOTE
Na 125, when corrected for hyperglycemia, Na 126  · Will hydrate with 0 9NS  · Obtain urine osmolality, urine Na and serum osmolality  · 1500 mL fluid restriction  · Monitor serum Na  · Nephrology consult

## 2020-01-16 NOTE — ASSESSMENT & PLAN NOTE
AST/, 152, Alk P 188, T Bili 2 10  · CT:  Liver unremarkable; mild ascites and splenomegaly  Cholelithiasis, mild pericholecystic fluid    RUQ U/S ordered  · Avoid hepatotoxins  · Acute hepatitis panel ordered  · GI consult

## 2020-01-16 NOTE — ASSESSMENT & PLAN NOTE
Ca 7 9, corrected calcium for hypoalbuminemia 8 2  Ionized Ca and Vit D level ordered  Monitor hypocalcemia while on IV hydration

## 2020-01-16 NOTE — CONSULTS
Consultation - Nephrology   Marisa Flores 80 y o  female MRN: [id number]  Unit/Bed#: ICU 05 Encounter: [id number]    ASSESSMENT AND PLAN:  Likely pseudo hyperkalemia  -BMP serum potassium greater than 10 although no significant EKG changes except sinus tachycardia is with occasional premature ectopic contractions  -I stat serum potassium 3 3 and also repeated again serum potassium with Gold top tube (this is serum separator with letting sample clot prior to centrifugation) showing K level 5 5  -no other acute intervention needed at this time  -pseudo hyperkalemia likely secondary to severe leukocytosis  -would recommend to continue to monitor either I-STAT potassium sample versus potassium sample in gold top tube in the future  -continue to avoid lisinopril for time being    Renal function overall stable with serum creatinine 0 9  -status post CT scan with IV contrast on 1/15/20, closely monitor for AIDAN  Avoid any other nephrotoxins or NSAIDs  Avoid hypotension   -continue to avoid lisinopril for time being  -currently remains on IV fluid which can be discontinued if tolerating diet  Hypertension  -blood pressure somewhat fluctuating with last couple readings above goal   Continue to monitor, continue to hold lisinopril   -currently on carvedilol    Severe leukocytosis with lymphocytosis, awaiting hematology evaluation  Concern for leukemia/lymphoma    Concern for? UTI, currently on IV antibiotic as per primary team   Urine culture results to 4-0  Hyponatremia based on BMP although Istat serum sodium level 138    -closely monitor  -serum sodium to closely monitor  Currently on IV normal saline  Transaminitis with CT scan showing cholelithiasis with? Concern for cholecystitis  Ultrasound to follow, GI and surgery to follow      Discussed above plan with primary team     HISTORY OF PRESENT ILLNESS:  Requesting Physician: Bethany Olguin DO  Reason for Consult:  Hyperkalemia    Marisa Flores is a 80y o  year old female who was admitted to [facility] after presenting with abdominal pain  A renal consultation is requested today for assistance in the management of hyperkalemia  No old medical records available to review old labs  Patient presented with significant abdominal pain issues  She is primarily [place] speaking  She was found to have severely elevated leukocytosis along with significantly higher potassium level  She presented with abdominal pain, somewhat poor p o  Intake  She was on lisinopril as outpatient although this remains on hold  She is overall hard of hearing  Part of this is also obtained from reviewing medical records and talking to nursing staff      PAST MEDICAL HISTORY:  Past Medical History:   Diagnosis Date    Constipation     Diabetes mellitus ([address] )     Hemorrhoids     Hypertension        PAST SURGICAL HISTORY:  Past Surgical History:   Procedure Laterality Date    HEMORROIDECTOMY      TUBAL LIGATION Bilateral        ALLERGIES:  Allergies   Allergen Reactions    Lisinopril Cough    No Active Allergies        SOCIAL HISTORY:  Social History     Substance and Sexual Activity   Alcohol Use Never    Frequency: Never     Social History     Substance and Sexual Activity   Drug Use Never     Social History     Tobacco Use   Smoking Status Never Smoker   Smokeless Tobacco Never Used   Tobacco Comment    NO TOBACCO USE       FAMILY HISTORY:  Family History   Problem Relation Age of Onset    Coronary artery disease Mother        MEDICATIONS:    Current Facility-Administered Medications:     acetaminophen (TYLENOL) tablet 650 mg, 650 mg, Oral, Q8H PRN, Caty Dowell, ERIC    bisacodyl (DULCOLAX) rectal suppository 10 mg, 10 mg, Rectal, Daily PRN, Caty Dowell, ERIC    calcium carbonate (TUMS) chewable tablet 1,000 mg, 1,000 mg, Oral, TID PRN, Caty Dowell, ERIC    carvedilol (COREG) tablet 12 5 mg, 12 5 mg, Oral, BID With Meals, Caty Dowell, CRNP, 12 5 mg at 01/16/20 0854    cefTRIAXone (ROCEPHIN) 1,000 mg in dextrose 5 % 50 mL IVPB, 1,000 mg, Intravenous, Q24H, Geroge Perfect, CRNP, Last Rate: 100 mL/hr at 01/16/20 0550, 1,000 mg at 01/16/20 0550    docusate sodium (COLACE) capsule 100 mg, 100 mg, Oral, BID, Geroge Perfect, CRNP, 100 mg at 01/16/20 0854    heparin (porcine) subcutaneous injection 5,000 Units, 5,000 Units, Subcutaneous, Q8H [address], 5,000 Units at 01/16/20 0601 **AND** [CANCELED] Platelet count, , , Once, Geroge Perfect, KATENP    insulin lispro (HumaLOG) 100 units/mL subcutaneous injection 1-6 Units, 1-6 Units, Subcutaneous, Q6H [address] **AND** Fingerstick Glucose (POCT), , , Q6H, Geroge PerfectKATENP    ondansetron [facility] injection 4 mg, 4 mg, Intravenous, Q6H PRN, Geroge Perfect, CRNP    sodium chloride 0 9 % infusion, 100 mL/hr, Intravenous, Continuous, Geroge Perfect, CRNP, Last Rate: 100 mL/hr at 01/16/20 0551, 100 mL/hr at 01/16/20 0551    REVIEW OF SYSTEMS:  Review of system remains somewhat limited as patient is very hard of hearing, Bengali-speaking and overall poor historian  No other pertinent positive findings other than mentioned in the note      PHYSICAL EXAM:  Current Weight: Weight - Scale: 98 4 kg (216 lb 14 9 oz)  First Weight: Weight - Scale: 98 4 kg (216 lb 14 9 oz)  Vitals:    01/16/20 0854   BP: 169/77   Pulse: (!) 111   Resp:    Temp:    SpO2:      No intake or output data in the 24 hours ending 01/16/20 1119  Wt Readings from Last 3 Encounters:   01/16/20 98 4 kg (216 lb 14 9 oz)   01/15/20 97 2 kg (214 lb 4 6 oz)   09/05/19 97 5 kg (215 lb)     Temp Readings from Last 3 Encounters:   01/16/20 97 6 °F (36 4 °C) (Temporal)   01/15/20 97 7 °F (36 5 °C) (Tympanic)   09/05/19 98 1 °F (36 7 °C)     BP Readings from Last 3 Encounters:   01/16/20 169/77   01/16/20 143/71   09/05/19 160/90     Pulse Readings from Last 3 Encounters:   01/16/20 (!) 111   01/16/20 (!) 122   09/05/19 87        Physical Examination:  General:  Lying in bed, no acute distress  Eyes:  Mild conjunctival pallor present  ENT:  External examination of ears and nose unremarkable  Neck:  Supple  Respiratory:  Bilateral air entry present  CVS:  S1, S2 present  GI:  Soft, nondistended  CNS:  Active alert oriented  Extremities:  Very trace edema in legs  Psych:  Conscious, oriented  Skin:  No new rash in legs    Invasive Devices:      Lab Results:   Results from last 7 days   Lab Units 01/16/20  1036 01/16/20  0444 01/15/20  2217   WBC Thousand/uL 644 02*  --  >400 00*   HEMOGLOBIN g/dL 10 8*  --  12 2   I STAT HEMOGLOBIN g/dl  --  16 3*  --    HEMATOCRIT % 33 6*  --  38 6   HEMATOCRIT, ISTAT %  --  48*  --    PLATELETS Thousands/uL 102*  --  140*   POTASSIUM mmol/L  --   --  >10 0*   CHLORIDE mmol/L  --   --  98   CO2 mmol/L  --   --  24   CO2, I-STAT mmol/L  --  26  --    BUN mg/dL  --   --  12   CREATININE mg/dL  --   --  0 96   CALCIUM mg/dL  --   --  7 9*   MAGNESIUM mg/dL  --   --  1 8   PHOSPHORUS mg/dL  --   --  3 2   GLUCOSE, ISTAT mg/dl  --  179*  --        Other Studies:   US right upper quadrant    (Results Pending)   US pelvis complete non OB    (Results Pending)   CT scan shows unremarkable kidneys, no hydronephrosis  Cholelithiasis with mild pericholecystic fluid  Mild ascites and splenomegaly present  Portions of the record may have been created with voice recognition software  Occasional wrong word or "sound a like" substitutions may have occurred due to the inherent limitations of voice recognition software  Read the chart carefully and recognize, using context, where substitutions have occurred

## 2020-01-16 NOTE — ASSESSMENT & PLAN NOTE
CT shows endometrial thickness measuring 14mm; pelvic ultrasound recommended  Pelvic U/S ordered  Consider inpatient GYN consult

## 2020-01-16 NOTE — ASSESSMENT & PLAN NOTE
UA WBC 10-20, +leuks, neg nitrite  Urine culture and blood culture in process  Will start IV Rocephin

## 2020-01-16 NOTE — PLAN OF CARE
Problem: PHYSICAL THERAPY ADULT  Goal: Performs mobility at highest level of function for planned discharge setting  See evaluation for individualized goals  Description  Treatment/Interventions: Functional transfer training, LE strengthening/ROM, Therapeutic exercise, Endurance training, Cognitive reorientation, Patient/family training, Equipment eval/education, Bed mobility, Gait training, Spoke to nursing, Family, OT  Equipment Recommended: Luci Rodriguez       See flowsheet documentation for full assessment, interventions and recommendations  Note:   Prognosis: Fair  Problem List: Decreased strength, Decreased endurance, Impaired balance, Decreased mobility, Decreased cognition, Impaired judgement, Decreased safety awareness, Impaired vision, Impaired hearing, Obesity, Decreased skin integrity, Pain(? abdominal pain, pt would not say)  Assessment: pt admitted with abdominal distention and dx with abnormal gall bladder, tachycardia, transaminities and ascites, bruising, possible uti, uterine thickening  pt referred to PT  pt was living wtih son, using rw for short distance amb to bathroom, able to dress self but needing assist bathing  pt is [place], reports decreased vision and speaks mostly Italian  pt demonstrated moderate to severe functional limitations due to recent illness and immobility at home  pt was able to mobilize in bed and OOB to chair, amb 3' lateral steps with mod assist of 2  pt did not use rw this session  pt has current deficits in strength, balance, gait sequencing and stability, posture, self care, vision and hearing  pt will need skilled PT to regain prior functional level  will likely need rehab  will assess progress while here  Barriers to Discharge: None     Recommendation: Post acute IP rehab     PT - OK to Discharge: Yes    See flowsheet documentation for full assessment

## 2020-01-16 NOTE — OCCUPATIONAL THERAPY NOTE
[address]  Evaluation     Patient Name: Lady Omer  CPNNJ'[id number] Date: 1/16/2020  Problem List  Principal Problem:    Acute hyperkalemia  Active Problems:    Essential hypertension    Type 2 diabetes mellitus without complication, without long-term current use of insulin (HCC)    Leukocytosis    Tachycardia    Hyponatremia    Acute cystitis without hematuria    Transaminitis    CKD (chronic kidney disease) stage 3, GFR 30-59 ml/min (HCC)    Hypocalcemia    Abnormal findings on diagnostic imaging of gallbladder    Endometrial hyperplasia    Failure to thrive in adult    Past Medical History  Past Medical History:   Diagnosis Date    Constipation     Diabetes mellitus ([address] )     Hemorrhoids     Hypertension      Past Surgical History  Past Surgical History:   Procedure Laterality Date    HEMORROIDECTOMY      TUBAL LIGATION Bilateral              01/16/20 1014   Note Type   Note type Eval/Treat   Restrictions/Precautions   Weight Bearing Precautions Per Order No   Other Precautions Bed Alarm; Fall Risk;Telemetry;Multiple lines;Pain;Hard of hearing;Cognitive  (Bengali speaking only)   Pain Assessment   Pain Assessment FLACC   Pain Rating: FLACC (Rest) - Face 0   Pain Rating: FLACC (Rest) - Legs 0   Pain Rating: FLACC (Rest) - Activity 0   Pain Rating: FLACC (Rest) - Cry 0   Pain Rating: FLACC (Rest) - Consolability 0   Score: FLACC (Rest) 0   Pain Rating: FLACC (Activity) - Face 1   Pain Rating: FLACC (Activity) - Legs 0   Pain Rating: FLACC (Activity) - Activity 0   Pain Rating: FLACC (Activity) - Cry 1   Pain Rating: FLACC (Activity) - Consolability 1   Score: FLACC (Activity) 3   Home Living   Type of Home House   Home Layout Two level; Able to live on main level with bedroom/bathroom; Performs ADLs on one level  (0 KEO, first floor setup)   Bathroom Shower/Tub Tub/shower unit  (sponge bathes)   Bathroom Toilet Standard   Bathroom Equipment Commode  (BSC in bedroom)   Bathroom Accessibility Accessible   Home Equipment Walker; Wheelchair-manual  (rollator)   Additional Comments pt poor historian and Polish speaking only and [place] and son present later to provide home setup   Prior Function   Level of [place] Needs assistance with IADLs; Needs assistance with ADLs and functional mobility; Independent with ADLs and functional mobility  (setup dressing, assist w/ bathing)   Lives With Family; Son   Receives Help From Family   ADL Assistance Needs assistance  (independent w/ dressing and assist w/ Bathing)   IADLs Needs assistance   Falls in the last 6 months 0   Vocational Retired   Comments pt supervision functional mobility short distance to bathroom w/ rollator in home and w/c or bed bound; pt (-) alone at home   Lifestyle   Autonomy per pt son independent w/ self-feeding, independent w/ dressing, assist w/ sponge bathing, assist w/ IADLs, supervision functional mobility w/ rollator   Reciprocal Relationships son   Service to Others retired   Intrinsic Gratification watch tv   ADL   Where Assessed Chair   Eating Assistance 5  [address] [facility] 4  Minimal Assistance   [address] 4  [address] 3  Moderate Assistance   [address] 4  [address] 3  [address]  3  Moderate Assistance   Bed Mobility   Supine to Sit 3  Moderate assistance   Additional items Assist x 2; Increased time required;LE management;Verbal cues; Bedrails;HOB elevated   Additional Comments increased time to complete   Transfers   Sit to Stand 3  Moderate assistance   Additional items Assist x 2; Increased time required;Verbal cues; Bedrails   Stand to Sit 3  Moderate assistance   Additional items Assist x 2; Increased time required;Verbal cues;Armrests   Stand pivot 3  Moderate assistance   Additional items Assist x 2; Increased time required;Verbal cues   Additional Comments VCs for safety and positioning   Functional Mobility   Functional Mobility 3  Moderate assistance   Additional Comments assist x2 w/ hand held assist, pt to benefit from RW   Balance   Static Sitting Fair   Dynamic Sitting Fair -   Static Standing Poor +   Dynamic Standing Poor +   Ambulatory Poor   Activity Tolerance   Activity Tolerance Patient limited by fatigue;Treatment limited secondary to medical complications (Comment)   Nurse Made Aware appropriate to see per Dana AGUILERA Assessment   RUE Assessment WFL  (grossly 3+/5)   LUE Assessment   LUE Assessment WFL  (grossly 3+/5)   Hand Function   Gross Motor Coordination Functional   Fine Motor Coordination Functional   Sensation   Light Touch No apparent deficits   Vision-Basic Assessment   Current Vision   (reports difficulty w/ vision at times)   Vision - Complex Assessment   Ocular Range [facility]   Acuity   (reports could not read board, told MD my name is on board)   Perception   Inattention/Neglect Appears intact   Cognition   Overall Cognitive Status Impaired   Arousal/Participation Responsive; Cooperative   Attention Difficulty attending to directions   Orientation Level Oriented to person;Oriented to place; Disoriented to time;Disoriented to situation   Memory Decreased short term memory;Decreased recall of recent events;Decreased recall of precautions   Following Commands Follows one step commands with increased time or repetition   Comments language barrier as pt is primarily Marshallese speaking, decreased insight and safety awareness, pt tearful at times requiring emotional support   Assessment   Limitation Decreased ADL status; Decreased UE strength;Decreased Safe judgement during ADL;Decreased cognition;Decreased endurance;Decreased self-care trans;Decreased high-level ADLs   Prognosis Good   Assessment Pt is a 80 y o  female seen for OT evaluation s/p admit to [facility] on 1/16/2020 w/ abdominal pain and distension   Pt transferred from [facility]  Comorbidities affecting pt's functional performance at time of assessment include: DM II, tachycardia, CKD III, hypocalemia, failure to thrive in adult, acute cystitis w/o  Hematuria, transaminitis, acute hyperkalemia  CT: Cholelithiasis with mild pericholecystic fluid   Personal factors affecting pt at time of IE include:poor historian, hard of hearing (pt son reports is new from past few weeks), Greek speaking only  Prior to admission, pt was living w/ son who provides 24/7 care/support and son reports pt is independent w/ dressing, assist w/ sponge bathing, supervision short distance mobility to bathroom w/ rollator, assist w/ w/c management (mainly in w/c or bed t/o the day), assist w/ IADLs  Upon evaluation: Pt requires MOD assist x2 supine>sit bed mobility w/ increased time to complete, MOD assist x2 sit<>Stand w/ VCs for hand placement and positioning, MOD assist x2 SPT to recliner chair, MOD-MAX assist LB ADLS, MIN assist UB ADLs, MOD assist toileting 2* the following deficits impacting occupational performance: decreased strength and endurance, impaired balance, impaired activity tolerance, increased pain, impaired activity tolerance, multiple lines, hard of hearing, language barrier, decreased insight and safety support, requires emotional support t/o session  Pt to benefit from continued skilled OT tx while in the hospital to address deficits as defined above and maximize level of functional independence w ADL's and functional mobility  Occupational Performance areas to address include: grooming, bathing/shower, toilet hygiene, dressing, health maintenance, functional mobility and clothing management, formal cognitive assessment, UE exercises  Pt in recliners w/ LEs elevated and all needs met, explained in Greek to use call bell for assistance  From OT standpoint, recommendation at time of d/c would be short term rehab      Goals   Patient Goals none expressed at this time 2* impaired cognition   LTG Time Frame 10-14   Long Term Goal please see below goals   Plan   Treatment Interventions ADL retraining;UE strengthening/ROM; Functional transfer training;Cognitive reorientation; Endurance training;Patient/family training;Equipment evaluation/education; Compensatory technique education; Activityengagement; Energy conservation   Goal Expiration Date 01/30/20   OT Frequency 3-5x/wk   Recommendation   OT Discharge Recommendation Short Term Rehab   Barthel Index   Feeding 10   Bathing 0   Grooming Score 5   Dressing Score 0   Bladder Score 5   Bowels Score 10   Toilet Use Score 5   Transfers (Bed/Chair) Score 5   Mobility (Level Surface) Score 0   Stairs Score 0   Barthel Index Score 40   Modified [place] Scale   Modified [place] Scale 4     Occupational Therapy Goals to be met in 10-14 days:  1) Pt will improve activity tolerance to G for 30 min txment sessions to enhance ADLs  2) Pt will complete UB ADLs/self care w/ setup and min assist LB ADLs  3) Pt will complete toileting w/ supervision w/ G hygiene/thoroughness using DME PRN  4) Pt will improve functional transfers on/off all surfaces using DME PRN w/ G balance/safety including toileting w/ min assist  5) Pt will improve fx'l mobility during I/ADl/leisure tasks using DME PRN w/ g balance/safety w/ min assist  6) Pt will engage in ongoing cognitive assessment w/ G participation to A w/ safe d/c planning/recommendations  7) Pt will demonstrate G carryover of pt/caregiver education and training as appropriate w/ mod I  w/ G tolerance  8) Pt will engage in depression screen/leisure interest checklist w/ G participation to monitor s/s depression and ID 3 positive coping strategies to A w/ emotional regulation and management  9) Pt will demonstrate 100% carryover of E C  techniques w/ mod I t/o fx'l I/ADL/leisure tasks w/o cues s/p skilled education  10) Pt will tolerate bed mobility and EOB seated tasks w/ min A for 30 mins to engage in fx'l I/ADL/leisure tasks w/ min A w/ min cues  11) Pt will demonstrate improved b/l UE strength by 1 MMT grade to enhance ADLS and functional transfers     Documentation completed by: Ben Schmitt MS, OTR/L

## 2020-01-16 NOTE — ED PROVIDER NOTES
History  Chief Complaint   Patient presents with    Loss of Appetite     Per EMS patient has not eaten for the past 1 5 weeks, son called family doctor and he told him to bring patient to the emergency room for evaluation and admission  79 yo F the past medical history of hypertension diabetes presenting for evaluation of abdominal pain  Patient presents with son who helps provide history  Son states the patient has been unable to eat solid foods for the past 1 5 weeks  She has been able to drink clear fluids at home  Son states she has no been able to eat due to generalized abdominal pain  States she has been constipated but last BM was yesterday  She has had n/v  No cp, sob, palpitations  No fevers  Patient has been drinking Metamucil at home without relief  No sick contacts  Pt with previous csection otherwise no abd surgeries  Prior to Admission Medications   Prescriptions Last Dose Informant Patient Reported? Taking?    FREESTYLE UNISTICK II LANCETS MISC   No No   Sig: Inject under the skin 2 (two) times a day Check blood sugar   carvedilol (COREG) 12 5 mg tablet   No No   Sig: Take 1 tablet (12 5 mg total) by mouth 2 (two) times a day with meals   glucose blood (FREESTYLE LITE) test strip   No No   Si each by Other route 2 (two) times a day Use as instructed   lisinopril (ZESTRIL) 5 mg tablet Not Taking at Unknown time  No No   Sig: Take 1 tablet (5 mg total) by mouth daily   Patient not taking: Reported on 2020   metFORMIN (GLUCOPHAGE) 500 mg tablet   No No   Sig: Take 1 tablet (500 mg total) by mouth 2 (two) times a day with meals   pravastatin (PRAVACHOL) 10 mg tablet   No No   Sig: Take 4 tablets (40 mg total) by mouth daily      Facility-Administered Medications: None       Past Medical History:   Diagnosis Date    Constipation     Diabetes mellitus ([facility] [address] )     Hemorrhoids     Hypertension        Past Surgical History:   Procedure Laterality Date    HEMORROIDECTOMY      TUBAL LIGATION Bilateral        Family History   Problem Relation Age of Onset    Coronary artery disease Mother      I have reviewed and agree with the history as documented  Social History     Tobacco Use    Smoking status: Never Smoker    Smokeless tobacco: Never Used    Tobacco comment: NO TOBACCO USE   Substance Use Topics    Alcohol use: Never     Frequency: Never    Drug use: Never        Review of Systems   All other systems reviewed and are negative  Physical Exam  Physical Exam   Constitutional: She is oriented to person, place, and time  She appears well-nourished  No distress  Morbidly obese   HENT:   Head: Normocephalic and atraumatic  Right Ear: External ear normal    Left Ear: External ear normal    Eyes: Conjunctivae are normal    EOM grossly intact   Neck: Normal range of motion  Neck supple  No JVD present  Cardiovascular: Normal rate  Pulmonary/Chest: Effort normal    Abdominal: Soft  She exhibits no distension  There is tenderness (generalized)  There is no guarding  Large abdomen consistent with body habitus   Musculoskeletal:   FROM, steady gait, cap refill brisk, strength and sensation grossly intact throughout   Lymphadenopathy:     She has no cervical adenopathy  Neurological: She is alert and oriented to person, place, and time  Skin: Skin is warm and dry  Capillary refill takes less than 2 seconds  She is not diaphoretic  Psychiatric: She has a normal mood and affect  Her behavior is normal    Nursing note and vitals reviewed        Vital Signs  ED Triage Vitals [01/15/20 2104]   Temperature Pulse Respirations Blood Pressure SpO2   97 7 °F (36 5 °C) (!) 115 18 (!) 150/104 93 %      Temp Source Heart Rate Source Patient Position - Orthostatic VS BP Location FiO2 (%)   Tympanic Monitor Lying Left arm --      Pain Score       No Pain           Vitals:    01/16/20 0000 01/16/20 0031 01/16/20 0200 01/16/20 0311   BP: 164/81 137/71 136/71 143/71   Pulse: (!) 118 (!) 121 (!) 114 (!) 122   Patient Position - Orthostatic VS: Lying Lying Lying Lying         Visual Acuity      ED Medications  Medications   sodium chloride 0 9 % bolus 1,000 mL (0 mL Intravenous Stopped 1/15/20 2322)   iodixanol (VISIPAQUE) 320 MG/ML injection 100 mL (100 mL Intravenous Given 1/15/20 2318)       Diagnostic Studies  Results Reviewed     Procedure Component Value Units Date/Time    Blood culture #1 [[id number]] Collected:  01/15/20 2142    Lab Status:  Preliminary result Specimen:  Blood from Arm, Right Updated:  01/16/20 1102     Blood Culture Received in Microbiology Lab  Culture in Progress  Blood culture #2 [[id number]] Collected:  01/15/20 2206    Lab Status:  Preliminary result Specimen:  Blood from Hand, Left Updated:  01/16/20 1102     Blood Culture Received in Microbiology Lab  Culture in Progress  Path Slide Review [[id number]] Collected:  01/15/20 2217    Lab Status:  Final result Specimen:  Blood from Arm, Right Updated:  01/16/20 1010     Path Review Atypical mononuclear cells (70%) with morphologic features concerning for blasts  Recommend stat flow cytometry evaluation and hematology consultation  Urine Microscopic [[id number]]  (Abnormal) Collected:  01/16/20 0017    Lab Status:  Final result Specimen:  Urine, Other Updated:  01/16/20 0036     RBC, UA None Seen /hpf      WBC, UA 10-20 /hpf      Epithelial Cells Occasional /hpf      Bacteria, UA Occasional /hpf     Urine culture [[id number]] Collected:  01/16/20 0017    Lab Status:   In process Specimen:  Urine, Other Updated:  01/16/20 0036    UA w Reflex to Microscopic w Reflex to Culture [[id number]]  (Abnormal) Collected:  01/16/20 0017    Lab Status:  Final result Specimen:  Urine, Other Updated:  01/16/20 0036     Color, UA Yellow     Clarity, UA Clear     Specific Gravity, UA 1 010     pH, UA 6 0     Leukocytes,  0     Nitrite, UA Negative     Protein, UA Negative mg/dl      Glucose, UA Negative mg/dl      Ketones, UA 5 (Trace) mg/dl      Bilirubin, UA Negative     Blood, UA 10 0     UROBILINOGEN UA Negative mg/dL     Comprehensive metabolic panel [[id number]]  (Abnormal) Collected:  01/15/20 2217    Lab Status:  Edited Result - FINAL Specimen:  Blood from Arm, Right Updated:  01/15/20 2342     Sodium 125 mmol/L      Potassium >10 0 mmol/L      Chloride 98 mmol/L      CO2 24 mmol/L      ANION GAP 3 mmol/L      BUN 12 mg/dL      Creatinine 0 96 mg/dL      Glucose 177 mg/dL      Calcium 7 9 mg/dL       U/L       U/L      Alkaline Phosphatase 188 U/L      Total Protein 6 3 g/dL      Albumin 3 6 g/dL      Total Bilirubin 2 10 mg/dL      eGFR 54 ml/min/1 73sq m     Narrative:       [place] guidelines for Chronic Kidney Disease (CKD):     Stage 1 with normal or high GFR (GFR > 90 mL/min/1 73 square meters)    Stage 2 Mild CKD (GFR = 60-89 mL/min/1 73 square meters)    Stage 3A Moderate CKD (GFR = 45-59 mL/min/1 73 square meters)    Stage 3B Moderate CKD (GFR = 30-44 mL/min/1 73 square meters)    Stage 4 Severe CKD (GFR = 15-29 mL/min/1 73 square meters)    Stage 5 End Stage CKD (GFR <15 mL/min/1 73 square meters)  Note: GFR calculation is accurate only with a steady state creatinine    CBC and differential [[id number]]  (Abnormal) Collected:  01/15/20 2217    Lab Status:  Final result Specimen:  Blood from Arm, Right Updated:  01/15/20 2307     WBC >400 00 Thousand/uL      RBC 3 95 Million/uL      Hemoglobin 12 2 g/dL      Hematocrit 38 6 %      MCV 98 fL      MCH 31 0 pg      MCHC 31 7 g/dL      RDW 19 0 %      MPV 6 8 fL      Platelets 052 Thousands/uL     Troponin I [[id number]]  (Normal) Collected:  01/15/20 2217    Lab Status:  Final result Specimen:  Blood from Arm, Right Updated:  01/15/20 2245     Troponin I 0 02 ng/mL     Protime-INR [[id number]]  (Abnormal) Collected:  01/15/20 2217    Lab Status:  Final result Specimen:  Blood from Arm, Right Updated:  01/15/20 2245     Protime 13 3 seconds      INR 1 20    APTT [[id number]]  (Normal) Collected:  01/15/20 2217    Lab Status:  Final result Specimen:  Blood from Arm, Right Updated:  01/15/20 2245     PTT 26 seconds     Phosphorus [[id number]]  (Normal) Collected:  01/15/20 2217    Lab Status:  Final result Specimen:  Blood from Arm, Right Updated:  01/15/20 2239     Phosphorus 3 2 mg/dL     Magnesium [[id number]]  (Normal) Collected:  01/15/20 2217    Lab Status:  Final result Specimen:  Blood from Arm, Right Updated:  01/15/20 2239     Magnesium 1 8 mg/dL     Lipase [[id number]]  (Normal) Collected:  01/15/20 2217    Lab Status:  Final result Specimen:  Blood from Arm, Right Updated:  01/15/20 2239     Lipase 174 u/L     Lactic acid, plasma x2 [[id number]]  (Normal) Collected:  01/15/20 2206    Lab Status:  Final result Specimen:  Blood from Hand, Left Updated:  01/15/20 2226     LACTIC ACID 1 7 mmol/L     Narrative:       Result may be elevated if tourniquet was used during collection  CT abdomen pelvis with contrast   Final Result by Kedar Rodriguez MD (01/15 2343)      Cholelithiasis with mild pericholecystic fluid  Findings are equivocal for cholecystitis and gallbladder ultrasound is recommended  Mild ascites and splenomegaly      Endometrial thickness measuring up to 14 mm  Nonemergent pelvic ultrasound recommended for further evaluation  The study was marked in EPIC for significant notification                    Workstation performed: [id number]                    Procedures  Procedures         ED Course  ED Course as of Desean 16 1658   Wed Desean 15, 2020   2244 Received call from lab stating K is >14, will need to redraw      [id number] with [facility] aric regarding pt abnormal labs, she will come to see and evaluate the pt and speak with her attending      21 [phone] Will sign out pt to dr garcia at this time                                  MDM  Number of Diagnoses or Management Options  Hyperkalemia:   Leukocytosis: Diagnosis management comments: 51-year-old female initially presenting for evaluation of not eating or drinking for 1 5 weeks secondary to abdominal pain, patient had many abnormalities in lab work and the lab even had as redraw multiple times because they thought that there were and consistency with the machines, patient had a potassium of over 14, white blood cell count over 600,000, she is thrombocytopenic along with having transaminitis, CT scan showed likely cholecystitis but will need follow-up ultrasound, I did speak with the critical care team about the patient who came to the ED to evaluate and the decision was made to transfer    Portions of the record may have been created with voice recognition software  Occasional wrong word or "sound a like" substitutions may have occurred due to the inherent limitations of voice recognition software  Read the chart carefully and recognize, using context, where substitutions have occurred  Disposition  Final diagnoses:   Leukocytosis   Hyperkalemia     Time reflects when diagnosis was documented in both MDM as applicable and the Disposition within this note     Time User Action Codes Description Comment    1/16/2020  1:30 AM Naina Diaz Add [D72 829] Leukocytosis     1/16/2020  1:30 AM Naina Diaz Add [E87 5] Hyperkalemia       ED Disposition     ED Disposition Condition Date/Time Comment    Transfer to Another Facility-In Network  Thu Jan 16, 2020  1:00 AM Marily Zambrano should be transferred out to [facility] MD Documentation      Most Recent Value   Patient Condition  The patient has been stabilized such that within reasonable medical probability, no material deterioration of the patient condition or the condition of the unborn child(nabila) is likely to result from the transfer   Reason for Transfer  Level of Care needed not available at this facility   Benefits of Transfer  Specialized equipment and/or services available at the receiving facility (Include comment)________________________ [hematology]   Risks of Transfer  Potential deterioration of medical condition, Loss of IV, Increased discomfort during transfer, Possible worsening of condition or death during transfer   Accepting Physician  Dr Erasto Segovia Name, [facility] Grade   Sending MD  Dr Cayla Obrien      RN Documentation      [facility] Name, [facility]   Bed Assignment  ICU 5      Follow-up Information    None         Discharge Medication List as of 1/16/2020  3:21 AM      CONTINUE these medications which have NOT CHANGED    Details   carvedilol (COREG) 12 5 mg tablet Take 1 tablet (12 5 mg total) by mouth 2 (two) times a day with meals, Starting Sun 9/8/2019, Normal      FREESTYLE UNISTICK II LANCETS MISC Inject under the skin 2 (two) times a day Check blood sugar, Starting Sun 9/8/2019, Normal      glucose blood (FREESTYLE LITE) test strip 1 each by Other route 2 (two) times a day Use as instructed, Starting Sun 9/8/2019, Normal      lisinopril (ZESTRIL) 5 mg tablet Take 1 tablet (5 mg total) by mouth daily, Starting Sun 9/8/2019, Normal      metFORMIN (GLUCOPHAGE) 500 mg tablet Take 1 tablet (500 mg total) by mouth 2 (two) times a day with meals, Starting Sun 9/8/2019, Normal      pravastatin (PRAVACHOL) 10 mg tablet Take 4 tablets (40 mg total) by mouth daily, Starting Sun 9/8/2019, Normal           No discharge procedures on file      ED Provider  Electronically Signed by           Ethelene Boast, PA-C  01/16/20 5003

## 2020-01-16 NOTE — CONSULTS
Consult: loss of appetite  PT wasn't able to provided diet hx at this time  Per H&P "patient had reported abdominal distention for 1-1/2 months  She also reported loss of appetite nausea and intermittent emesis with mucus  Patient thinks that she may have lost some weight "  Reviewed weight hx records: 2/4/19 210lbs, 9/4/19 215lbs, 1/26/20 216lbs, weights not showing significant weight changes, trace edema of LE's and UE's documented by nursing 1/16/20  Through observation, no signfiant signs of muscle/fat loss found throughout the body  BMI 35  Currently NPO  Will await plan of care for diet advancement vs need for alternate modes of nutrition  If ok for PO can trial supplements to aid with pro, cam intake  Goal diet CCD2, 2gm Na, due to elevated K would recommend 2gm K diet as well if plans for diet advancement

## 2020-01-16 NOTE — ASSESSMENT & PLAN NOTE
K >10; discussed with ICU attending and thought to be pseudohyperkalemia secondary to WBC lysis  EKG  ordered, monitor for peaked T-waves, QT or ST changes, i-stat requested  · i-Stat requested  · IV hydration  · Monitor serum K  · Continuous cardiac monitor

## 2020-01-16 NOTE — CONSULTS
Consultation - Terrell Merchant 80 y o  female MRN: [id number]    Unit/Bed#: ICU 05 Encounter: [id number]      Assessment/Plan   80-year-old female with history of type 2 diabetes who presented with abdominal pain and distension and was found to have severe leukocytosis, elevated LFTs and hypothyroidism  No clinical evidence of myxedema-patient not bradycardic, hypotensive or hypothermic  Will start thyroid hormone replacement-levothyroxine 25 mcg daily in after 1 week dose can be increased to 50 mcg daily -she will need repeat thyroid function test in the next 4-6 weeks    Type 2 diabetes with hyperglycemia-continue holding oral hypoglycemics  Sugars fairly stable on correctional insulin-will continue that while she is NPO  As starts eating she will need initiation of basal bolus insulin therapy  Severe leukocytosis-Hematology-Oncology consultation is pending  CC: elevated tsh  Consult    History of Present Illness     HPI: Terrell Merchant is a 80y o  year old female with type 2 diabetes who presented to the hospital with complain of abdominal pain and distension-during workup she was found severe leukocytosis , hyperkalemia and elevated TSH-endocrine consult is requested from a evaluation of elevated TSH  Patient is a poor historian-I have tried reaching out to the son but his phone was switched off  History is obtained from the patient with the help of a  as well as from the nurse by the bedside  Patient denies any prior history of thyroid dysfunction-list of home meds reviewed and does not include levothyroxine  She has been having abdominal pain, distention and constipation for the past few days  She also has has had loss of appetite, nausea and vomiting at home  Currently she is feeling better and denies any abdominal pain  Also denies nausea or vomiting now-she has been NPO since admission    She has not been bradycardic, hypothermic or hypotensive since admission        Consults    Review of Systems   Unable to perform ROS: Other       Historical Information   Past Medical History:   Diagnosis Date    Constipation     Diabetes mellitus ([address] )     Hemorrhoids     Hypertension      Past Surgical History:   Procedure Laterality Date    HEMORROIDECTOMY      TUBAL LIGATION Bilateral      Social History   Social History     Substance and Sexual Activity   Alcohol Use Never    Frequency: Never     Social History     Substance and Sexual Activity   Drug Use Never     Social History     Tobacco Use   Smoking Status Never Smoker   Smokeless Tobacco Never Used   Tobacco Comment    NO TOBACCO USE     Family History:   Family History   Problem Relation Age of Onset    Coronary artery disease Mother        Meds/Allergies   Current Facility-Administered Medications   Medication Dose Route Frequency Provider Last Rate Last Dose    acetaminophen (TYLENOL) tablet 650 mg  650 mg Oral Q8H PRN Han Rise, CRNP        bisacodyl (DULCOLAX) rectal suppository 10 mg  10 mg Rectal Daily PRN Han Rise, CRNP        calcium carbonate (TUMS) chewable tablet 1,000 mg  1,000 mg Oral TID PRN Han Rise, CRNP        carvedilol (COREG) tablet 12 5 mg  12 5 mg Oral BID With Meals Han Estrada CRNP   12 5 mg at 01/16/20 1645    cefTRIAXone (ROCEPHIN) 1,000 mg in dextrose 5 % 50 mL IVPB  1,000 mg Intravenous Q24H Han Estrada, CRNP 100 mL/hr at 01/16/20 0550 1,000 mg at 01/16/20 0550    docusate sodium (COLACE) capsule 100 mg  100 mg Oral BID Han Estrada, CRNP   100 mg at 01/16/20 0854    heparin (porcine) subcutaneous injection 5,000 Units  5,000 Units Subcutaneous [facility] Han Estrada, CRNP   5,000 Units at 01/16/20 1422    insulin lispro (HumaLOG) 100 units/mL subcutaneous injection 1-6 Units  1-6 Units Subcutaneous Q6H [facility] Han Estrada, CRNP   1 Units at 01/16/20 1211    ondansetron (ZOFRAN) injection 4 mg  4 mg Intravenous Q6H PRN ERIC Tristan        sodium chloride 0 9 % infusion  75 mL/hr Intravenous Continuous Bridget Mccoy MD 75 mL/hr at 01/16/20 1207 75 mL/hr at 01/16/20 1207     Allergies   Allergen Reactions    Lisinopril Cough    No Active Allergies        Objective   Vitals: Blood pressure 138/79, pulse 105, temperature 98 6 °F (37 °C), temperature source Temporal, resp  rate (!) 30, height 5' 6" (1 676 m), weight 98 4 kg (216 lb 14 9 oz), SpO2 94 %  Intake/Output Summary (Last 24 hours) at 1/16/2020 1650  Last data filed at 1/16/2020 1357  Gross per 24 hour   Intake 626 67 ml   Output 400 ml   Net 226 67 ml     Invasive Devices     Peripheral Intravenous Line            Peripheral IV 01/15/20 Left Wrist less than 1 day    Peripheral IV 01/15/20 Left Wrist less than 1 day                Physical Exam   Constitutional: She appears well-developed and well-nourished  No distress  HENT:   Head: Atraumatic  Eyes: EOM are normal  No scleral icterus  Neck: Normal range of motion  Neck supple  No thyromegaly present  Cardiovascular: Normal rate, regular rhythm and normal heart sounds  No murmur heard  Pulmonary/Chest: Effort normal and breath sounds normal  No respiratory distress  She has no wheezes  She has no rales  Abdominal: Soft  Bowel sounds are normal  She exhibits distension  There is no tenderness  Musculoskeletal: She exhibits no edema or deformity  Lymphadenopathy:     She has no cervical adenopathy  Neurological: She is alert  Skin: Skin is warm and dry  Vitals reviewed  The history was obtained from the review of the chart, patient and nurse      Lab Results:   Results from last 7 days   Lab Units 01/16/20  1036   HEMOGLOBIN A1C % 8 7*     Lab Results   Component Value Date     02 (HH) 01/16/2020    HGB 10 8 (L) 01/16/2020    HCT 33 6 (L) 01/16/2020     (H) 01/16/2020     (L) 01/16/2020     Lab Results   Component Value Date/Time    BUN 10 01/16/2020 10:36 AM    K 5 3 01/16/2020 10:36 AM    K 5 3 01/16/2020 10:36 AM     01/16/2020 10:36 AM    CO2 24 01/16/2020 10:36 AM    CO2 26 01/16/2020 04:44 AM    CREATININE 1 01 01/16/2020 10:36 AM     (H) 01/16/2020 10:36 AM     (H) 01/16/2020 10:36 AM    ALB 3 4 (L) 01/16/2020 10:36 AM     No results for input(s): CHOL, HDL, LDL, TRIG, VLDL in the last 72 hours  No results found for: Ynes Koch  POC Glucose (mg/dl)   Date Value   01/16/2020 184 (H)   01/16/2020 166 (H)     TSH 65, free T4 0 41    Imaging Studies: I have personally reviewed pertinent reports  Study Result     CT ABDOMEN AND PELVIS WITH IV CONTRAST     INDICATION:   Abdominal distension      COMPARISON:  None      TECHNIQUE:  CT examination of the abdomen and pelvis was performed  Axial, sagittal, and coronal 2D reformatted images were created from the source data and submitted for interpretation      Radiation dose length product (DLP) for this visit:  929 mGy-cm   This examination, like all CT scans performed in the [facility], was performed utilizing techniques to minimize radiation dose exposure, including the use of iterative   reconstruction and automated exposure control      IV Contrast:  100 mL of iodixanol (VISIPAQUE)  Enteric Contrast:  Enteric contrast was not administered      FINDINGS:     ABDOMEN     LOWER CHEST:  No clinically significant abnormality identified in the visualized lower chest      LIVER/BILIARY TREE:  Unremarkable      GALLBLADDER:  There are gallstone(s) within the gallbladder  There is mild pericholecystic fluid      SPLEEN:  The spleen is enlarged, measuring 14 7 cm     PANCREAS:  Unremarkable      ADRENAL GLANDS:  Unremarkable      KIDNEYS/URETERS:  Unremarkable  No hydronephrosis      STOMACH AND BOWEL:  Unremarkable      APPENDIX:  No findings to suggest appendicitis      ABDOMINOPELVIC CAVITY:  Numerous prominent mesenteric nodes    No lymphadenopathy      VESSELS: Unremarkable for patient's age      PELVIS     REPRODUCTIVE ORGANS:  Endometrial thickening measuring up to 14 mm      URINARY BLADDER:  Unremarkable      ABDOMINAL WALL/INGUINAL REGIONS:  Unremarkable      OSSEOUS STRUCTURES:  No acute fracture or destructive osseous lesion      IMPRESSION:     Cholelithiasis with mild pericholecystic fluid  Findings are equivocal for cholecystitis and gallbladder ultrasound is recommended      Mild ascites and splenomegaly     Endometrial thickness measuring up to 14 mm  Nonemergent pelvic ultrasound recommended for further evaluation      The study was marked in EPIC for significant notification                    Portions of the record may have been created with voice recognition software

## 2020-01-16 NOTE — ASSESSMENT & PLAN NOTE
CT: Cholelithiasis with mild pericholecystic fluid  Findings are equivocal for cholecystitis and gallbladder ultrasound is recommended  Denies RUQ pain, afebrile   Morales's **  NPO, IV hydration  RUQ U/S ordered  Will hold antibiotics pending result of U/S  Surgery consult  GI consult

## 2020-01-17 ENCOUNTER — APPOINTMENT (INPATIENT)
Dept: RADIOLOGY | Facility: HOSPITAL | Age: 85
DRG: 841 | End: 2020-01-17
Payer: MEDICARE

## 2020-01-17 ENCOUNTER — APPOINTMENT (INPATIENT)
Dept: CT IMAGING | Facility: HOSPITAL | Age: 85
DRG: 841 | End: 2020-01-17
Payer: MEDICARE

## 2020-01-17 VITALS
HEIGHT: 66 IN | SYSTOLIC BLOOD PRESSURE: 115 MMHG | HEART RATE: 108 BPM | BODY MASS INDEX: 34.86 KG/M2 | TEMPERATURE: 99 F | OXYGEN SATURATION: 93 % | WEIGHT: 216.93 LBS | DIASTOLIC BLOOD PRESSURE: 77 MMHG | RESPIRATION RATE: 27 BRPM

## 2020-01-17 LAB
ALBUMIN SERPL BCP-MCNC: 3.2 G/DL (ref 3.5–5)
ALP SERPL-CCNC: 211 U/L (ref 46–116)
ALT SERPL W P-5'-P-CCNC: 121 U/L (ref 12–78)
ANION GAP SERPL CALCULATED.3IONS-SCNC: 10 MMOL/L (ref 4–13)
AST SERPL W P-5'-P-CCNC: 79 U/L (ref 5–45)
BASE EXCESS BLDA CALC-SCNC: -1 MMOL/L (ref -2–3)
BILIRUB SERPL-MCNC: 1.42 MG/DL (ref 0.2–1)
BUN SERPL-MCNC: 11 MG/DL (ref 5–25)
CA-I BLD-SCNC: 1.16 MMOL/L (ref 1.12–1.32)
CALCIUM SERPL-MCNC: 8.1 MG/DL (ref 8.3–10.1)
CHLORIDE SERPL-SCNC: 105 MMOL/L (ref 100–108)
CO2 SERPL-SCNC: 25 MMOL/L (ref 21–32)
CREAT SERPL-MCNC: 1.17 MG/DL (ref 0.6–1.3)
ERYTHROCYTE [DISTWIDTH] IN BLOOD BY AUTOMATED COUNT: 19.7 % (ref 11.6–15.1)
GFR SERPL CREATININE-BSD FRML MDRD: 42 ML/MIN/1.73SQ M
GLUCOSE SERPL-MCNC: 137 MG/DL (ref 65–140)
GLUCOSE SERPL-MCNC: 147 MG/DL (ref 65–140)
GLUCOSE SERPL-MCNC: 152 MG/DL (ref 65–140)
GLUCOSE SERPL-MCNC: 159 MG/DL (ref 65–140)
HCO3 BLDA-SCNC: 24.2 MMOL/L (ref 22–28)
HCT VFR BLD AUTO: 32.7 % (ref 34.8–46.1)
HCT VFR BLD CALC: 45 % (ref 34.8–46.1)
HGB BLD-MCNC: 10.4 G/DL (ref 11.5–15.4)
HGB BLDA-MCNC: 15.3 G/DL (ref 11.5–15.4)
LDH SERPL-CCNC: 3009 U/L (ref 81–234)
MCH RBC QN AUTO: 31.8 PG (ref 26.8–34.3)
MCHC RBC AUTO-ENTMCNC: 31.8 G/DL (ref 31.4–37.4)
MCV RBC AUTO: 100 FL (ref 82–98)
NRBC BLD AUTO-RTO: 0 /100 WBCS
OSMOLALITY UR: 648 MMOL/KG
PCO2 BLD: 25 MMOL/L (ref 21–32)
PCO2 BLD: 41.1 MM HG (ref 36–44)
PH BLD: 7.38 [PH] (ref 7.35–7.45)
PLATELET # BLD AUTO: 92 THOUSANDS/UL (ref 149–390)
PMV BLD AUTO: 9.2 FL (ref 8.9–12.7)
PO2 BLD: 65 MM HG (ref 75–129)
POTASSIUM BLD-SCNC: 3.7 MMOL/L (ref 3.5–5.3)
POTASSIUM SERPL-SCNC: 5.5 MMOL/L (ref 3.5–5.3)
PROT SERPL-MCNC: 6 G/DL (ref 6.4–8.2)
RBC # BLD AUTO: 3.27 MILLION/UL (ref 3.81–5.12)
RETICS # AUTO: ABNORMAL 10*3/UL (ref 14097–95744)
RETICS # CALC: 2.93 % (ref 0.37–1.87)
SAO2 % BLD FROM PO2: 92 % (ref 60–85)
SODIUM 24H UR-SCNC: 9 MOL/L
SODIUM BLD-SCNC: 139 MMOL/L (ref 136–145)
SODIUM SERPL-SCNC: 140 MMOL/L (ref 136–145)
SPECIMEN SOURCE: ABNORMAL
WBC # BLD AUTO: 651.9 THOUSAND/UL (ref 4.31–10.16)

## 2020-01-17 PROCEDURE — 82330 ASSAY OF CALCIUM: CPT

## 2020-01-17 PROCEDURE — 99233 SBSQ HOSP IP/OBS HIGH 50: CPT | Performed by: INTERNAL MEDICINE

## 2020-01-17 PROCEDURE — 71045 X-RAY EXAM CHEST 1 VIEW: CPT

## 2020-01-17 PROCEDURE — 85045 AUTOMATED RETICULOCYTE COUNT: CPT | Performed by: INTERNAL MEDICINE

## 2020-01-17 PROCEDURE — 36569 INSJ PICC 5 YR+ W/O IMAGING: CPT

## 2020-01-17 PROCEDURE — 82803 BLOOD GASES ANY COMBINATION: CPT

## 2020-01-17 PROCEDURE — 85014 HEMATOCRIT: CPT

## 2020-01-17 PROCEDURE — 83615 LACTATE (LD) (LDH) ENZYME: CPT | Performed by: INTERNAL MEDICINE

## 2020-01-17 PROCEDURE — 82947 ASSAY GLUCOSE BLOOD QUANT: CPT

## 2020-01-17 PROCEDURE — 85025 COMPLETE CBC W/AUTO DIFF WBC: CPT | Performed by: INTERNAL MEDICINE

## 2020-01-17 PROCEDURE — 80053 COMPREHEN METABOLIC PANEL: CPT | Performed by: INTERNAL MEDICINE

## 2020-01-17 PROCEDURE — 84132 ASSAY OF SERUM POTASSIUM: CPT

## 2020-01-17 PROCEDURE — 99239 HOSP IP/OBS DSCHRG MGMT >30: CPT | Performed by: INTERNAL MEDICINE

## 2020-01-17 PROCEDURE — 84295 ASSAY OF SERUM SODIUM: CPT

## 2020-01-17 PROCEDURE — 71250 CT THORAX DX C-: CPT

## 2020-01-17 PROCEDURE — C1751 CATH, INF, PER/CENT/MIDLINE: HCPCS

## 2020-01-17 PROCEDURE — 99232 SBSQ HOSP IP/OBS MODERATE 35: CPT | Performed by: INTERNAL MEDICINE

## 2020-01-17 PROCEDURE — 82948 REAGENT STRIP/BLOOD GLUCOSE: CPT

## 2020-01-17 PROCEDURE — 84300 ASSAY OF URINE SODIUM: CPT | Performed by: NURSE PRACTITIONER

## 2020-01-17 PROCEDURE — 83935 ASSAY OF URINE OSMOLALITY: CPT | Performed by: NURSE PRACTITIONER

## 2020-01-17 PROCEDURE — 02HV33Z INSERTION OF INFUSION DEVICE INTO SUPERIOR VENA CAVA, PERCUTANEOUS APPROACH: ICD-10-PCS | Performed by: INTERNAL MEDICINE

## 2020-01-17 RX ORDER — ALLOPURINOL 100 MG/1
300 TABLET ORAL DAILY
Status: DISCONTINUED | OUTPATIENT
Start: 2020-01-17 | End: 2020-01-17 | Stop reason: HOSPADM

## 2020-01-17 RX ORDER — ALLOPURINOL 300 MG/1
300 TABLET ORAL DAILY
Qty: 30 TABLET | Refills: 0 | Status: SHIPPED | OUTPATIENT
Start: 2020-01-18

## 2020-01-17 RX ORDER — LEVOTHYROXINE SODIUM 0.03 MG/1
25 TABLET ORAL
Qty: 30 TABLET | Refills: 0 | Status: SHIPPED | OUTPATIENT
Start: 2020-01-18

## 2020-01-17 RX ORDER — SODIUM CHLORIDE 9 MG/ML
75 INJECTION, SOLUTION INTRAVENOUS CONTINUOUS
Qty: 1000 ML | Refills: 0 | Status: SHIPPED | OUTPATIENT
Start: 2020-01-17 | End: 2020-02-16

## 2020-01-17 RX ORDER — BISACODYL 10 MG
10 SUPPOSITORY, RECTAL RECTAL DAILY PRN
Qty: 12 SUPPOSITORY | Refills: 0 | Status: SHIPPED | OUTPATIENT
Start: 2020-01-17

## 2020-01-17 RX ADMIN — ALLOPURINOL 300 MG: 100 TABLET ORAL at 11:32

## 2020-01-17 RX ADMIN — DOCUSATE SODIUM 100 MG: 100 CAPSULE, LIQUID FILLED ORAL at 11:36

## 2020-01-17 RX ADMIN — SODIUM CHLORIDE 75 ML/HR: 0.9 INJECTION, SOLUTION INTRAVENOUS at 11:24

## 2020-01-17 RX ADMIN — CARVEDILOL 12.5 MG: 12.5 TABLET, FILM COATED ORAL at 11:32

## 2020-01-17 RX ADMIN — HEPARIN SODIUM 5000 UNITS: 5000 INJECTION INTRAVENOUS; SUBCUTANEOUS at 06:43

## 2020-01-17 NOTE — TRANSPORTATION MEDICAL NECESSITY
Section I - General Information    Name of Patient: Minnie Alfonso                 : 1933    Medicare #: [id number]  Transport Date: 20 (PCS is valid for round trips on this date and for all repetitive trips in the 60-day range as noted below )  Origin: [address] [place] [address]: [facility]  Is the pt's stay covered under Medicare Part A (PPS/DRG)   [x]     Closest appropriate facility? If no, why is transport to more distant facility required? Yes  If hospice pt, is this transport related to pt's terminal illness? No       Section II - Medical Necessity Questionnaire  Ambulance transportation is medically necessary only if other means of transport are contraindicated or would be potentially harmful to the patient  To meet this requirement, the patient must either be "bed confined" or suffer from a condition such that transport by means other than ambulance is contraindicated by the patient's condition  The following questions must be answered by the medical professional signing below for this form to be valid:    1)  Describe the MEDICAL CONDITION (physical and/or mental) of this patient AT [address] [place] [address] that requires the patient to be transported in an ambulance and why transport by other means is contraindicated by the patient's condition: transfer for a higher level of care, to start treatment for new dx of lukemia    2) Is the patient "bed confined" as defined below? No  To be "be confined" the patient must satisfy all three of the following conditions: (1) unable to get up from bed without Assistance; AND (2) unable to ambulate; AND (3) unable to sit in a chair or wheelchair  3) Can this patient safely be transported by car or wheelchair van (i e , seated during transport without a medical attendant or monitoring)?    No    4) In addition to completing questions 1-3 above, please check any of the following conditions that apply*:   *Note: supporting documentation for any boxes checked must be maintained in the patient's medical records  If hosp-hosp transfer, describe services needed at 2nd facility not available at 1st facility? Medical attendant required   Cardiac monitoring required en route       Section III - Signature of Physician or Healthcare Professional  I certify that the above information is true and correct based on my evaluation of this patient, and represent that the patient requires transport by ambulance and that other forms of transport are contraindicated  I understand that this information will be used by the Centers for Medicare and Medicaid Services (CMS) to support the determination of medical necessity for ambulance services, and I represent that I have personal knowledge of the patient's condition at time of transport  []  If this box is checked, I also certify that the patient is physically or mentally incapable of signing the ambulance service's claim and that the institution with which I am affiliated has furnished care, services, or assistance to the patient  My signature below is made on behalf of the patient pursuant to 42 CFR §424 36(b)(4)  In accordance with 42 CFR §424 37, the specific reason(s) that the patient is physically or mentally incapable of signing the claim form is as follows: Gurvinder Novak of Physician* or Healthcare Professional_ Colton Mcdonald MS LSW  Signature Date 01/17/20 (For scheduled repetitive transports, this form is not valid for transports performed more than 60 days after this date)    Printed Name & Credentials of Physician or Healthcare Professional (MD, DO, RN, etc ) Colton Mcdonald MS LSW  *Form must be signed by patient's attending physician for scheduled, repetitive transports   For non-repetitive, unscheduled ambulance transports, if unable to obtain the signature of the attending physician, any of the following may sign (choose appropriate option below)  [] Physician Assistant []  Clinical Nurse Specialist []  Registered Nurse  []  Nurse Practitioner  [x] Discharge Planner

## 2020-01-17 NOTE — PROGRESS NOTES
RN called patient's son Geremias Ervin to make him aware of the patient's pick-up time of [phone] for her transfer to [facility]

## 2020-01-17 NOTE — PROGRESS NOTES
Patient Name: Agnes Steve  Patient MRN: [id number]  Date: 01/17/20  Service: [facility]    Subjective   Seen  With son at bedside acting as   Pt denies any abd pain today  Vitals  Blood pressure 115/77, pulse (!) 114, temperature 97 9 °F (36 6 °C), temperature source Temporal, resp  rate 22, height 5' 6" (1 676 m), weight 98 4 kg (216 lb 14 9 oz), SpO2 92 %  no distress not jaudiced  abd obese with no rebound or guarding  No murpheys sign  +bs  Breathing nonlabored  HR regular but tachy    Laboratory Studies  Results from last 7 days   Lab Units 01/17/20  0551 01/17/20  0549 01/16/20  1036 01/16/20  0444 01/15/20  2217   WBC Thousand/uL  --  651 90* 644 02*  --  >400 00*   HEMOGLOBIN g/dL  --  10 4* 10 8*  --  12 2   I STAT HEMOGLOBIN g/dl 15 3  --   --  16 3*  --    HEMATOCRIT %  --  32 7* 33 6*  --  38 6   HEMATOCRIT, ISTAT % 45  --   --  48*  --    PLATELETS Thousands/uL  --  92* 102*  --  140*   INR   --   --   --   --  1 20*     Results from last 7 days   Lab Units 01/17/20  0551 01/17/20  0549 01/16/20  1036 01/16/20  0933 01/16/20  0444 01/15/20  2217   SODIUM mmol/L  --  140 139 126*  --  125*   POTASSIUM mmol/L  --  5 5* 5 3  5 3 >10 0*  --  >10 0*   CHLORIDE mmol/L  --  105 102 99*  --  98   CO2 mmol/L  --  25 24 23  --  24   CO2, I-STAT mmol/L 25  --   --   --  26  --    BUN mg/dL  --  11 10 10  --  12   CREATININE mg/dL  --  1 17 1 01 0 93  --  0 96   CALCIUM mg/dL  --  8 1* 8 4 7 8*  --  7 9*   ALBUMIN g/dL  --  3 2* 3 4* 3 3*  --  3 6   TOTAL BILIRUBIN mg/dL  --  1 42* 1 61* 1 61*  --  2 10*   ALK PHOS U/L  --  211* 229* 237*  --  188*   ALT U/L  --  121* 140* 135*  --  152*   AST U/L  --  79* 102* 167*  --  154*   GLUCOSE, ISTAT mg/dl 159*  --   --   --  179*  --      onc note appreciated      Inhouse Medications     Current Facility-Administered Medications:     acetaminophen (TYLENOL) tablet 650 mg, 650 mg, Oral, Q8H PRN    allopurinol (ZYLOPRIM) tablet 300 mg, 300 mg, Oral, Daily, 300 mg at 01/17/20 1132    bisacodyl (DULCOLAX) rectal suppository 10 mg, 10 mg, Rectal, Daily PRN    calcium carbonate (TUMS) chewable tablet 1,000 mg, 1,000 mg, Oral, TID PRN    carvedilol (COREG) tablet 12 5 mg, 12 5 mg, Oral, BID With Meals, 12 5 mg at 01/17/20 1132    cefTRIAXone (ROCEPHIN) 1,000 mg in dextrose 5 % 50 mL IVPB, 1,000 mg, Intravenous, Q24H, 1,000 mg at 01/16/20 0550    docusate sodium (COLACE) capsule 100 mg, 100 mg, Oral, BID, 100 mg at 01/17/20 1136    heparin (porcine) subcutaneous injection 5,000 Units, 5,000 Units, Subcutaneous, Q8H [address], 5,000 Units at 01/17/20 0643 **AND** [CANCELED] Platelet count, , , Once    insulin lispro (HumaLOG) 100 units/mL subcutaneous injection 1-6 Units, 1-6 Units, Subcutaneous, Q6H [address], 1 Units at 01/16/20 1211 **AND** Fingerstick Glucose (POCT), , , Q6H    levothyroxine tablet 25 mcg, 25 mcg, Oral, Early Morning    ondansetron (ZOFRAN) injection 4 mg, 4 mg, Intravenous, Q6H PRN    sodium chloride 0 9 % infusion, 75 mL/hr, Intravenous, Continuous, 75 mL/hr at 01/17/20 1124      Assessment/Plan:  Significant leukocytosis; concern for lymphoma/leukemia  Discussion about transfer to [facility]  lfts stable ; doubt symptomatic gallstones and lfts may be related to underlying bone marrow process  Continue to monitor lfts  hida if t bili rises for concern for symptomatic gallstone/blockage  Ok to feed patient from [facility]            Fariba Woods MD

## 2020-01-17 NOTE — ASSESSMENT & PLAN NOTE
Lab Results   Component Value Date    HGBA1C 8 7 (H) 01/16/2020       Recent Labs     01/16/20  1205 01/16/20  1759 01/17/20  0631 01/17/20  1211   POCGLU 184* 143* 147* 137       · A1c ordered  · Hold oral hypoglycemics while inpatient,start Accu-Cheks and sliding scale  · ADA diet when diet advanced    Blood Sugar Average: Last 72 hrs:  (P) 155 4

## 2020-01-17 NOTE — PROGRESS NOTES
Patient has extremely high WBC count with anemia and thrombocytopenia and abnormal LFTs  Question of CLL versus acute leukemia  She is behaving more like chronic leukemia and is clinically stable  Slide reviewer has reported blast cells  Patient speaks [place]ri[place] and very little [place]  She is hard of hearing  She was transferred from [facility] because of abdominal pain and distension  She is not having fevers and chills  There is no active bleeding  She appears to be alert  No icterus  No oral thrush  No palpable neck mass  Clear lung fields  Regular heart rate  Abdomen soft  No palpable abdominal mass  No ascites  No edema of feet  She has compression boots  She can move all 4 extremities  No skin rash  No petechiae  No palpable lymphadenopathy  Blood counts and chemistry as reported     I am starting her on allopurinol  I spoke with primary physician about hydration  She has poor venous access  I have communicated with patient's son and explained the condition on the phone and mentioned of transferring to [address] [place] [address]  I communicated with patient's primary physician  I communicated with Dr Jeannie Mendez at [facility] and he would accept the patient  I called transfer center and gave the information to Dory Trent and nurse will be faxing patient's insurance information to the transfer center  If patient did not get transferred she will start getting leukemia therapy here  Pending leukemia flow study results  Discussed with the nurse

## 2020-01-17 NOTE — SOCIAL WORK
Faxed insurance information to [facility] - awaiting to see if they can accept based on insurance  Dr Oleg Fabian placed the ADT 21 order in [facility]

## 2020-01-17 NOTE — ED ATTENDING ATTESTATION
I was the attending physician on duty at the time the patient visited the emergency department  The patient was evaluated and dispositioned by the APC  I was personally available for consultation  I am administratively signing the chart after the fact      Miller Mcdonald MD

## 2020-01-17 NOTE — PROCEDURES
Insert PICC line  Date/Time: 1/17/2020 10:15 AM  Performed by: Alesha Tapia RN  Authorized by: Damian Carey MD     Patient location:  Bedside  Consent:     Consent obtained:  Written    Consent given by:  Aftab Ansari (pt's son [facility])    Procedural risks discussed: consent obtained by physician  [place] protocol:     Procedure explained and questions answered to patient or proxy's satisfaction: yes      Relevant documents present and verified: yes      Test results available and properly labeled: yes      Radiology Images displayed and confirmed  If images not available, report reviewed: yes      Required blood products, implants, devices, and special equipment available: yes      Site/side marked: yes      Immediately prior to procedure, a time out was called: yes      Patient identity confirmed:  Verbally with patient, arm band, provided demographic data, hospital-assigned identification number and legal responsible patient representative (family)  Pre-procedure details:     Hand hygiene: Hand hygiene performed prior to insertion      Sterile barrier technique: All elements of maximal sterile technique followed      Skin preparation:  ChloraPrep    Skin preparation agent: Skin preparation agent completely dried prior to procedure    Indications:     PICC line indications: other (comment)      PICC line indications comment:  No iv access  Anesthesia (see MAR for exact dosages):      Anesthesia method:  Local infiltration (3ml)    Local anesthetic:  Lidocaine 1% w/o epi  Procedure details:     Location:  Basilic    Vessel type: vein      Laterality:  Right    Approach: percutaneous technique used      Patient position:  Flat    Procedural supplies:  Double lumen    Catheter size:  5 Fr    Landmarks identified: yes      Ultrasound guidance: yes      Sterile ultrasound techniques: Sterile gel and sterile probe covers were used      Number of attempts:  1    Successful placement: yes      Vessel of catheter tip end:  Chest Xray needed to confirm placement    Total catheter length (cm):  42    Catheter out on skin (cm):  0    Max flow rate:  999ml/hr    Arm circumference:  42cm  Post-procedure details:     Post-procedure:  Dressing applied and securement device placed    Assessment:  Blood return through all ports, free fluid flow and placement verified by x-ray (cxr confirmed picc terminates svc)    Post-procedure complications: none      Patient tolerance of procedure:   Tolerated well, no immediate complications  Comments:      Lot#[id number] 2020-12-31

## 2020-01-17 NOTE — DISCHARGE SUMMARY
Discharge- Jessee Vera 1933, 80 y o  female MRN: [id number]    Unit/Bed#: ICU 05 Encounter: [id number]    Primary Care Provider: Ling Ibanez MD   Date and time admitted to hospital: 1/16/2020  3:35 AM      Severe Leukocytosis  Assessment & Plan  Severe leukocytosis with WBC >400, rule out malignancy  · Hematology consulted, likely CLL versus acute leukemia  · Plan to discharge to [address] [place] [address]  · Accepting physician, Dr Annie Malik  · Patient and son in agreement    Abnormal findings on diagnostic imaging of gallbladder  Assessment & Plan  CT: Cholelithiasis with mild pericholecystic fluid   Findings are equivocal for cholecystitis and gallbladder ultrasound is recommended     Right upper quadrant ultrasound negative for acute cholecystitis  Surgery and GI input appreciated    CKD (chronic kidney disease) stage 3, GFR 30-59 ml/min ([facility])  Assessment & Plan  Creatinine 0 9, no baseline on file    Hyponatremia  Assessment & Plan  · Believed to be pseudohyperkalemia, normal on labs today    Type 2 diabetes mellitus without complication, without long-term current use of insulin [facility])  Assessment & Plan  Lab Results   Component Value Date    HGBA1C 8 7 (H) 01/16/2020       Recent Labs     01/16/20  1205 01/16/20  1759 01/17/20  0631 01/17/20  1211   POCGLU 184* 143* 147* 137       · A1c ordered  · Hold oral hypoglycemics while inpatient,start Accu-Cheks and sliding scale  · ADA diet when diet advanced    Blood Sugar Average: Last 72 hrs:  (P) 155 4      Essential hypertension  Assessment & Plan  Maintained on carvedilol, continue    * Acute hyperkalemia  Assessment & Plan  Pseudo hyperkalemia, normal on today's labs    Hypothyroidism  -started on levothyroxine, endocrinology consultation appreciated        Transition of Care Discharge Summary - [facility]    Patient Information: Jessee Vera 80 y o  female MRN: [id number]  Unit/Bed#: ICU 05 Encounter: [id number]    Discharging Physician / Practitioner: Antoni Michaels MD  PCP: Ling Ibanez MD  Admission Date: 1/16/2020  Discharge Date: 01/17/20    Disposition:      Inpatient [address] at [facility], [address] to [facility]:   · Not Applicable to this Patient - Not Applicable to this Patient    Reason for Admission: weakness, abdominal pain    Discharge Diagnoses:     Principal Problem:    Acute hyperkalemia  Active Problems:    Essential hypertension    Type 2 diabetes mellitus without complication, without long-term current use of insulin ([facility])    Leukocytosis    Tachycardia    Hyponatremia    Acute cystitis without hematuria    Transaminitis    CKD (chronic kidney disease) stage 3, GFR 30-59 ml/min ([facility])    Hypocalcemia    Abnormal findings on diagnostic imaging of gallbladder    Endometrial hyperplasia    Failure to thrive in adult  Resolved Problems:    * No resolved hospital problems  *      Consultations During Hospital Stay:  · Heme/Onc  · GI  · Surgery  · Nephrology  · Endocrinology    Procedures Performed:     · PICC    Medication Adjustments and Discharge Medications:  · Medication Dosing Tapers - Please refer to Discharge Medication List for details on any medication dosing tapers (if applicable to patient)  · Discharge Medication List: See after visit summary for reconciled discharge medications  Wound Care Recommendations:  When applicable, please see wound care section of After Visit Summary  Diet Recommendations at Discharge:  Diet -        Diet Orders   (From admission, onward)             Start     Ordered    01/16/20 0435  Diet NPO; Sips with meds  Diet effective now     Comments:  1500ml fluid restriction   Question Answer Comment   Diet Type NPO    NPO Except: Sips with meds    RD to adjust diet per protocol? Yes        01/16/20 0443              Fluid Restriction - No Fluid Restriction at Discharge        Significant Findings / Test Results:     CT abdomen pelvis:Cholelithiasis with mild pericholecystic fluid  Findings are equivocal for cholecystitis and gallbladder ultrasound is recommended      Mild ascites and splenomegaly     Endometrial thickness measuring up to 14 mm  Nonemergent pelvic ultrasound recommended for further evaluation  Ultrasound right upper quadrant:  Cholelithiasis without evidence of acute cholecystitis    Ultrasound pelvis transabdominal:  Endometrial stripe appears thickened measuring 14 mm, endometrial carcinoma cannot be excluded    CT chest:1   Mildly enlarged right paratracheal lymph node, measuring 10 mm in short axis, nonspecific and possibly reactive  No other mediastinal hilar lymphadenopathy identified within limitations of noncontrast technique         2  Scattered atelectasis and trace left pleural effusion  Hospital Course:     Ron Rivera is a 80 y o  female patient who originally presented to the hospital on 1/16/2020 due to abdominal pain  Patient transferred from [facility] with abdominal pain and distension  Patient was having abdominal distension for 1-2 months and having a poor appetite  She has been on Metamucil for 7 years  Patient blood work significant for leukocytosis, hyponatremia, hyperkalemia, she was admitted to ICU for close monitoring  Nephrology consulted, hyponatremia and hypokalemia were pseudo due to severe leukocytosis  GI and surgery consulted for patient's transaminitis, right upper quadrant ultrasound was negative for any acute cholecystitis    Oncology consulted for patient's severe leukocytosis, stated CLL versus acute leukemia, case discussed with [facility] at [address] [place] [address] to transfer to [facility] under accepting physician, Dr Tiarra Hewitt  Patient and patient's son in agreement  Please see above problem list for further details      Condition at Discharge: good     Discharge Day Visit / Exam:     Subjective:  Patient seen examined at bedside, appears very emotional denies any abdominal pain, nausea or vomiting    Vitals: Blood Pressure: 115/77 (01/17/20 1145)  Pulse: (!) 108 (01/17/20 1330)  Temperature: 99 °F (37 2 °C) (01/17/20 1115)  Temp Source: Temporal (01/17/20 1115)  Respirations: (!) 27 (01/17/20 1330)  Height: 5' 6" (167 6 cm) (01/16/20 0353)  Weight - Scale: 98 4 kg (216 lb 14 9 oz) (01/16/20 0353)  SpO2: 93 % (01/17/20 1330)    Physical Exam:    Constitutional: Patient is in no acute distress  HEENT:  Normocephalic, atraumatic, EOMI, PERRLA, no scleral icterus, no pallor, moist oral mucosa  Neck:  Supple, no masses, no thyromegaly, no bruits Normal range of motion  Lymph nodes:  No lymphadenopathy  Cardiovascular: Normal S1S2, RRR, No murmurs/rubs/gallops appreciated  Pulmonary:  Bilateral air entry, No rhonchi/rales/wheezing appreciated  Abdominal: Soft, Bowel sounds present, Non-tender, Non-distended, No rebound/guarding, no hepatomegaly   Musculoskeletal: No tenderness/abnormality   Extremities:  No cyanosis, clubbing or edema  Peripheral pulses palpable and equal bilaterally  Neurological: Cranial nerves II-XII grossly intact, sensation intact, otherwise no focal neurological symptoms  Skin: Skin is warm and dry, no rashes  Discharge instructions/Information to patient and family:   See after visit summary section titled Discharge Instructions for information provided to patient and family  Planned Readmission: no      Discharge Statement:  I spent 40 minutes discharging the patient  This time was spent on the day of discharge  I had direct contact with the patient on the day of discharge  Greater than 50% of the total time was spent examining patient, answering all patient questions, arranging and discussing plan of care with patient as well as directly providing post-discharge instructions  Additional time then spent on discharge activities      ** Please Note: This note has been constructed using a voice recognition system **

## 2020-01-17 NOTE — CONSULTS
Consultation - Medical Oncology   Jessee Vera 80 y o  female MRN: [id number]  Unit/Bed#: ICU 05 Encounter: [id number]    History of Present Illness   Physician Requesting Consult: Denice Loss, DO  Reason for Consult / Principal Problem:  Severe leukocytosis  HPI: Jessee Vera is a 80y o  year old female who presents with decreased appetite and diffuse abdominal, and dizziness in the past 10 days prompting this hospital admission  Also, she has noted new onset of easy bruising  The patient was placed on broad-spectrum antibiotic therapy with ceftriaxone 1 g every 24 hours and was started on levothyroxine 25 mcg daily    Inpatient consult to Hematology  Consult performed by: Merari Craven MD  Consult ordered by: Hilarie Babinski, CRNP          ROS:   General: No chills or swaets  Head and Neck: No nosebleeds, no oral cavity or throat soreness  Cardiovascular: No chest pain, no lower extremity edema  Respriatory: No cough or dyspnea at rest  GI:  See HPI, she has had chronic constipation and has been using psyllium for at least 5 years  : No urinary frequency  Musculoskeletal: No back pains or joint pain  Skin: No skin rash  Neurological: No headache, no numbness    She has had limited mobility walking from the bed to the bathroom at most in the past year  Hematologic:  See HPI  Psychiatric: No emotional problems    Historical Information   Past Medical History:   Diagnosis Date    Constipation     Diabetes mellitus ([address] )     Hemorrhoids     Hypertension      Past Surgical History:   Procedure Laterality Date    HEMORROIDECTOMY      TUBAL LIGATION Bilateral      Social History   Social History     Substance and Sexual Activity   Alcohol Use Never    Frequency: Never     Social History     Substance and Sexual Activity   Drug Use Never     Social History     Tobacco Use   Smoking Status Never Smoker   Smokeless Tobacco Never Used   Tobacco Comment    NO TOBACCO USE     She was born in the hospitals, 1st came to the [facility]  in 1969 and [place]   Since [place]  She has been a housewife    Family History:   Family History   Problem Relation Age of Onset    Coronary artery disease Mother      One of her sisters passed away with breast cancer, the other siblings have all passed away in the hospitals and she does not know their medical history  She has 2 sons and 2 daughters, 1 son and 1 daughter both have type 2 diabetes mellitus, otherwise her 4 children have been in good health    Meds/Allergies   current meds:   Current Facility-Administered Medications   Medication Dose Route Frequency    acetaminophen (TYLENOL) tablet 650 mg  650 mg Oral Q8H PRN    bisacodyl (DULCOLAX) rectal suppository 10 mg  10 mg Rectal Daily PRN    calcium carbonate (TUMS) chewable tablet 1,000 mg  1,000 mg Oral TID PRN    carvedilol (COREG) tablet 12 5 mg  12 5 mg Oral BID With Meals    cefTRIAXone (ROCEPHIN) 1,000 mg in dextrose 5 % 50 mL IVPB  1,000 mg Intravenous Q24H    docusate sodium (COLACE) capsule 100 mg  100 mg Oral BID    heparin (porcine) subcutaneous injection 5,000 Units  5,000 Units Subcutaneous Q8H [address]    insulin lispro (HumaLOG) 100 units/mL subcutaneous injection 1-6 Units  1-6 Units Subcutaneous Q6H [address]    [START ON 1/17/2020] levothyroxine tablet 25 mcg  25 mcg Oral Early Morning    ondansetron (ZOFRAN) injection 4 mg  4 mg Intravenous Q6H PRN    sodium chloride 0 9 % infusion  75 mL/hr Intravenous Continuous    and PTA meds:    Medications Prior to Admission   Medication    aspirin (ECOTRIN LOW STRENGTH) 81 mg EC tablet    carvedilol (COREG) 12 5 mg tablet    FREESTYLE UNISTICK II LANCETS MISC    glucose blood (FREESTYLE LITE) test strip    lisinopril (ZESTRIL) 5 mg tablet    metFORMIN (GLUCOPHAGE) 500 mg tablet    pravastatin (PRAVACHOL) 10 mg tablet     Allergies   Allergen Reactions    Lisinopril Cough    No Active Allergies        Objective Vitals: Blood pressure 138/79, pulse 105, temperature 98 6 °F (37 °C), temperature source Temporal, resp  rate (!) 30, height 5' 6" (1 676 m), weight 98 4 kg (216 lb 14 9 oz), SpO2 94 %  Intake/Output Summary (Last 24 hours) at 1/16/2020 1916  Last data filed at 1/16/2020 1357  Gross per 24 hour   Intake 626 67 ml   Output 400 ml   Net 226 67 ml     Invasive Devices     None                 Physical Exam: General appearance: Appears comfortable at rest  Head: Normocephalic  Eyes: Extraocular movements intact  Ears: No gross hearing deficit  Oropharynx: Clear  Neck: Supple, No lymphadenopathy  Chest: No axillary adenopathy  Lungs: Clear to auscultation bilaterally  Heart: Regular rate and rhythm  Abdomen: No tenderness or distention, no hepatic or splenic enlargement; No inguinal  lymphadenopathy  Extremities: No lower extremity edema bilaterally  Skin: No rashes  She has a few faded bruises of the upper extremities and of the flanks  Neurologic: Grossly intact, no focal neurological deficit (the patient was examined in bed and gait was not observed )  Psychiatric: Oriented to person and place    Lab Results:     From January 16, 2020:  Creatinine 1 01, calcium 8 4, , , alk-phos 229, bili 1 61,  02, hemoglobin 10 8 with , platelets 667, on WBC differential segs 5%, lymphs 83%, monos 2%, atypical lymphs 10%, NRBCs 1%, free T4 is 0 41 (0 76 -1 46), TSH is 65 967, hepatitis B surface antigen and hepatitis-B core antibody are nonreactive, hepatitis-C antibody is nonreactive    Contrast enhanced CT of the abdomen pelvis from January 15, 2020:  Liver is unremarkable, spleen is enlarged to 14 7 cm, pancreas, adrenals, kidneys are unremarkable, there are numerous prominent mesenteric nodes, there is endometrial thickening up to 14 mm, no destructive osseous lesion  Ultrasound of the right upper quadrant from January 16, 2020:   There is cholelithiasis without acute cholecystitis    Assessment/Plan     Assessment:    Severe lymphocytosis, chronic lymphocytic leukemia is suspected  Plan:    Hematology evaluation is to begin with flow cytometry on peripheral blood, LD, and uric acid  In view of the significant lymphocytosis treatment is to be initiated in the near future with IV fluid support to reduce risk of acute tumor lysis syndrome  Counseling / Coordination of Care  Total floor / unit time spent today 45 minutes  Greater than 50% of total time was spent with the patient and / or family counseling and / or coordination of care  A description of the counseling / coordination of care:  Diagnostic tests, impressions and recommendations were reviewed with the patient's son Brayan Han by telephone today [phone] and with Wilfred Doyle by telephone today

## 2020-01-17 NOTE — PROGRESS NOTES
NEPHROLOGY PROGRESS NOTE   Marily Zambrano 80 y o  female MRN: [id number]  Unit/Bed#: ICU 05 Encounter: [id number]  Reason for Consult: Hyperkalemia    ASSESSMENT AND PLAN:  Likely pseudo hyperkalemia  -initial BMP serum potassium greater than 10 although no significant EKG changes except sinus tachycardia is with occasional premature ectopic contractions  -I stat serum potassium 3 3 and on repeat check 3 7 today  -serum potassium level repeated on Gold top tube is 5 5    -no other acute intervention needed at this time  -pseudo hyperkalemia likely secondary to severe leukocytosis  -would recommend to continue to monitor either I-STAT potassium sample versus potassium sample in gold top tube in the future  -continue to avoid lisinopril for time being     ?  CKD, no prior baseline available  -serum creatinine 0 9 on admission slightly increased to 1 1 today  -status post CT scan with IV contrast on 1/15/20, closely monitor for AIDAN  Avoid any other nephrotoxins or NSAIDs  Avoid hypotension   -continue to avoid lisinopril for time being  -she lost peripheral IV line and currently remains off IV fluid, if this can be initiated, would recommend to continue maintenance IV Plasma-Lyte 70 mL/hour for now      Hypertension  -blood pressure overall acceptable  Continue to monitor, continue to hold lisinopril   -currently on carvedilol     Severe leukocytosis with lymphocytosis  -suspected CLL as per Hematology note  Concern for? UTI, currently on IV antibiotic as per primary team   Urine culture results to follow      Initial hyponatremia on BMP,? Lab error in the setting of severe leukocytosis  -repeat levels on gold top tube has been normal   I stat serum sodium 139       Transaminitis with CT scan showing cholelithiasis with? Concern for cholecystitis  GI follow-up ongoing      Discussed above plan with primary team     SUBJECTIVE:  Patient seen and examined at bedside    Remains on 3 L oxygen via nasal cannula although saturating very well and seems to be comfortable  No nausea or vomiting  She is primarily [address] speaking and very hard of hearing      OBJECTIVE:  Current Weight: Weight - Scale: 98 4 kg (216 lb 14 9 oz)  Vitals:    01/17/20 0700   BP:    Pulse:    Resp:    Temp: 97 9 °F (36 6 °C)   SpO2:        Intake/Output Summary (Last 24 hours) at 1/17/2020 0804  Last data filed at 1/16/2020 2030  Gross per 24 hour   Intake 1121 67 ml   Output 400 ml   Net 721 67 ml     Wt Readings from Last 3 Encounters:   01/16/20 98 4 kg (216 lb 14 9 oz)   01/15/20 97 2 kg (214 lb 4 6 oz)   09/05/19 97 5 kg (215 lb)     Temp Readings from Last 3 Encounters:   01/17/20 97 9 °F (36 6 °C) (Temporal)   01/15/20 97 7 °F (36 5 °C) (Tympanic)   09/05/19 98 1 °F (36 7 °C)     BP Readings from Last 3 Encounters:   01/17/20 120/58   01/16/20 143/71   09/05/19 160/90     Pulse Readings from Last 3 Encounters:   01/17/20 84   01/16/20 (!) 122   09/05/19 87      Physical Examination:  General:  Lying in bed, no acute distress   Eyes:  Mild conjunctival pallor present  ENT:  External examination of ears and nose unremarkable  Neck:  Supple  Respiratory:  Bilateral air entry present  CVS:  S1, S2 present  GI:  Soft, nondistended  CNS:  Active, alert, oriented  Extremities:  No significant edema in legs  Skin:  No new rash in legs    Medications:    Current Facility-Administered Medications:     acetaminophen (TYLENOL) tablet 650 mg, 650 mg, Oral, Q8H PRN, ERIC [address]    bisacodyl (DULCOLAX) rectal suppository 10 mg, 10 mg, Rectal, Daily PRN, [address]    calcium carbonate (TUMS) chewable tablet 1,000 mg, 1,000 mg, Oral, TID PRN, [address]    carvedilol (COREG) tablet 12 5 mg, 12 5 mg, Oral, BID With Meals, [address], 12 5 mg at 01/16/20 1645    cefTRIAXone (ROCEPHIN) 1,000 mg in dextrose 5 % 50 mL IVPB, 1,000 mg, Intravenous, Q24H, ERIC Franklin, Last Rate: 100 mL/hr at 01/16/20 0550, 1,000 mg at 01/16/20 0550    docusate sodium (COLACE) capsule 100 mg, 100 mg, Oral, BID, ERIC Garcia, 100 mg at 01/16/20 1830    heparin (porcine) subcutaneous injection 5,000 Units, 5,000 Units, Subcutaneous, Q8H [address], 5,000 Units at 01/17/20 0643 **AND** [CANCELED] Platelet count, , , Once, ERIC Garcia    insulin lispro (HumaLOG) 100 units/mL subcutaneous injection 1-6 Units, 1-6 Units, Subcutaneous, Q6H [address], 1 Units at 01/16/20 1211 **AND** Fingerstick Glucose (POCT), , , Q6H, ERIC Garcia    levothyroxine tablet 25 mcg, 25 mcg, Oral, Early Morning, Tejinder Sullivan MD    ondansetron [facility]) injection 4 mg, 4 mg, Intravenous, Q6H PRN, ERIC Garcia    sodium chloride 0 9 % infusion, 75 mL/hr, Intravenous, Continuous, Mukul Wood MD, Last Rate: 75 mL/hr at 01/17/20 0028, 75 mL/hr at 01/17/20 0028    Laboratory Results:  Results from last 7 days   Lab Units 01/17/20  0551 01/17/20  0549 01/16/20  1036 01/16/20  0933 01/16/20  0444 01/15/20  2217   WBC Thousand/uL  --   --  644 02*  --   --  >400 00*   HEMOGLOBIN g/dL  --   --  10 8*  --   --  12 2   I STAT HEMOGLOBIN g/dl 15 3  --   --   --  16 3*  --    HEMATOCRIT %  --   --  33 6*  --   --  38 6   HEMATOCRIT, ISTAT % 45  --   --   --  48*  --    PLATELETS Thousands/uL  --   --  102*  --   --  140*   SODIUM mmol/L  --  140 139 126*  --  125*   POTASSIUM mmol/L  --  5 5* 5 3  5 3 >10 0*  --  >10 0*   CHLORIDE mmol/L  --  105 102 99*  --  98   CO2 mmol/L  --  25 24 23  --  24   CO2, I-STAT mmol/L 25  --   --   --  26  --    BUN mg/dL  --  11 10 10  --  12   CREATININE mg/dL  --  1 17 1 01 0 93  --  0 96   CALCIUM mg/dL  --  8 1* 8 4 7 8*  --  7 9*   MAGNESIUM mg/dL  --   --   --   --   --  1 8   PHOSPHORUS mg/dL  --   --   --   --   --  3 2   GLUCOSE, ISTAT mg/dl 159*  --   --   --  179*  --        US pelvis transabdominal only   Final Result by Moira Timmons MD (01/17 4358)       Markedly limited exam, endometrial stripe appears thickened measuring 14 mm, endometrial carcinoma to be excluded  Workstation performed: [id number]         US right upper quadrant   Final Result by Jeanie Shetty MD (01/16 1741)      Cholelithiasis without evidence of acute cholecystitis  Workstation performed: [id number]         CT chest wo contrast    (Results Pending)       Portions of the record may have been created with voice recognition software  Occasional wrong word or "sound a like" substitutions may have occurred due to the inherent limitations of voice recognition software  Read the chart carefully and recognize, using context, where substitutions have occurred

## 2020-01-17 NOTE — PLAN OF CARE
Problem: Potential for Falls  Goal: Patient will remain free of falls  Description  INTERVENTIONS:  - Assess patient frequently for physical needs  -  Identify cognitive and physical deficits and behaviors that affect risk of falls  -  [place] fall precautions as indicated by assessment   - Educate patient/family on patient safety including physical limitations  - Instruct patient to call for assistance with activity based on assessment  - Modify environment to reduce risk of injury  - Consider OT/PT consult to assist with strengthening/mobility  Outcome: Progressing     Problem: Prexisting or High Potential for Compromised Skin Integrity  Goal: Skin integrity is maintained or improved  Description  INTERVENTIONS:  - Identify patients at risk for skin breakdown  - Assess and monitor skin integrity  - Assess and monitor nutrition and hydration status  - Monitor labs   - Assess for incontinence   - Turn and reposition patient  - Assist with mobility/ambulation  - Relieve pressure over bony prominences  - Avoid friction and shearing  - Provide appropriate hygiene as needed including keeping skin clean and dry  - Evaluate need for skin moisturizer/barrier cream  - Collaborate with interdisciplinary team   - Patient/family teaching  - Consider wound care consult   Outcome: Progressing     Problem: Nutrition/Hydration-ADULT  Goal: Nutrient/Hydration intake appropriate for improving, restoring or maintaining nutritional needs  Description  Monitor and assess patient's nutrition/hydration status for malnutrition  Collaborate with interdisciplinary team and initiate plan and interventions as ordered  Monitor patient's weight and dietary intake as ordered or per policy  Utilize nutrition screening tool and intervene as necessary  Determine patient's food preferences and provide high-protein, high-caloric foods as appropriate       INTERVENTIONS:  - Monitor oral intake, urinary output, labs, and treatment plans  - Assess nutrition and hydration status and recommend course of action  - Evaluate amount of meals eaten  - Assist patient with eating if necessary   - Allow adequate time for meals  - Recommend/ encourage appropriate diets, oral nutritional supplements, and vitamin/mineral supplements  - Order, calculate, and assess calorie counts as needed  - Recommend, monitor, and adjust tube feedings and TPN/PPN based on assessed needs  - Assess need for intravenous fluids  - Provide specific nutrition/hydration education as appropriate  - Include patient/family/caregiver in decisions related to nutrition  Outcome: Progressing     Problem: CARDIOVASCULAR - ADULT  Goal: Maintains optimal cardiac output and hemodynamic stability  Description  INTERVENTIONS:  - Monitor I/O, vital signs and rhythm  - Monitor for S/S and trends of decreased cardiac output  - Administer and titrate ordered vasoactive medications to optimize hemodynamic stability  - Assess quality of pulses, skin color and temperature  - Assess for signs of decreased coronary artery perfusion  - Instruct patient to report change in severity of symptoms  Outcome: Progressing  Goal: Absence of cardiac dysrhythmias or at baseline rhythm  Description  INTERVENTIONS:  - Continuous cardiac monitoring, vital signs, obtain 12 lead EKG if ordered  - Administer antiarrhythmic and heart rate control medications as ordered  - Monitor electrolytes and administer replacement therapy as ordered  Outcome: Progressing     Problem: METABOLIC, FLUID AND ELECTROLYTES - ADULT  Goal: Electrolytes maintained within normal limits  Description  INTERVENTIONS:  - Monitor labs and assess patient for signs and symptoms of electrolyte imbalances  - Administer electrolyte replacement as ordered  - Monitor response to electrolyte replacements, including repeat lab results as appropriate  - Instruct patient on fluid and nutrition as appropriate  Outcome: Progressing  Goal: Fluid balance maintained  Description  INTERVENTIONS:  - Monitor labs   - Monitor I/O and WT  - Instruct patient on fluid and nutrition as appropriate  - Assess for signs & symptoms of volume excess or deficit  Outcome: Progressing  Goal: Glucose maintained within target range  Description  INTERVENTIONS:  - Monitor Blood Glucose as ordered  - Assess for signs and symptoms of hyperglycemia and hypoglycemia  - Administer ordered medications to maintain glucose within target range  - Assess nutritional intake and initiate nutrition service referral as needed  Outcome: Progressing     Problem: HEMATOLOGIC - ADULT  Goal: Maintains hematologic stability  Description  INTERVENTIONS  - Assess for signs and symptoms of bleeding or hemorrhage  - Monitor labs  - Administer supportive blood products/factors as ordered and appropriate  Outcome: Progressing

## 2020-01-18 LAB
ATRIAL RATE: 112 BPM
BACTERIA UR CULT: ABNORMAL
P AXIS: 152 DEGREES
PR INTERVAL: 118 MS
QRS AXIS: -23 DEGREES
QRSD INTERVAL: 80 MS
QT INTERVAL: 318 MS
QTC INTERVAL: 434 MS
T WAVE AXIS: -32 DEGREES
VENTRICULAR RATE: 112 BPM

## 2020-01-18 PROCEDURE — 93010 ELECTROCARDIOGRAM REPORT: CPT | Performed by: INTERNAL MEDICINE

## 2020-01-21 LAB
BACTERIA BLD CULT: NORMAL
BACTERIA BLD CULT: NORMAL

## 2020-01-22 DIAGNOSIS — E11.9 TYPE 2 DIABETES MELLITUS WITHOUT COMPLICATION, WITHOUT LONG-TERM CURRENT USE OF INSULIN (HCC): Primary | ICD-10-CM

## 2020-01-22 RX ORDER — UBIQUINOL 100 MG
CAPSULE ORAL
Qty: 100 EACH | Refills: 0 | Status: SHIPPED | OUTPATIENT
Start: 2020-01-22 | End: 2020-01-27

## 2020-01-24 DIAGNOSIS — E11.9 TYPE 2 DIABETES MELLITUS WITHOUT COMPLICATION, WITHOUT LONG-TERM CURRENT USE OF INSULIN (HCC): ICD-10-CM

## 2020-01-24 NOTE — PHYSICIAN ADVISOR
Current patient class: Inpatient  The patient is currently on Hospital Day: 2 at [facility]      The patient was admitted to the hospital at [address] [place] [address] on 1/16/20 for the following diagnosis:  Leukocytosis [D72 [id number]]       There is documentation in the medical record of an expected length of stay of at least 2 midnights  The patient is therefore expected to satisfy the 2 midnight benchmark and given the 2 midnight presumption is appropriate for INPATIENT ADMISSION  Given this expectation of a satisfying stay, CMS instructs us that the patient is most often appropriate for inpatient admission under part A provided medical necessity is documented in the chart  After review of the relevant documentation, labs, vital signs and test results, the patient is appropriate for INPATIENT ADMISSION  Admission to the hospital as an inpatient is a complex decision making process which requires the practitioner to consider the patients presenting complaint, history and physical examination and all relevant testing  With this in mind, in this case, the patient was deemed appropriate for INPATIENT ADMISSION  After review of the documentation and testing available at the time of the admission I concur with this clinical determination of medical necessity  Rationale is as follows: The patient is a 80 yrs old Female who presented to the ED at 1/16/2020  3:35 AM with a chief complaint of poor appetite and abdominal discomfort  The patient was found to have significant leukocytosis with a WBC count of greater than 400,000  Patient was also shown to have acute hyperkalemia with potassium of greater than 10  Patient was found to have a viral hepatitis as well  Patient also had reported loss of appetite and weakness  Patient was found to have a sodium of 125 as well  Patient was started on IV Rocephin for acute cystitis with hematuria    As per the documentation as above the patient was expected to stay greater than 2 midnights  Patient was admitted to the ICU for close monitoring  Oncology was consulted and stated it CLL versus acute leukemia  The case was discussed with [address] [facility] [address] [facility] and the patient was transferred there  Given the above that the patient still needed acute inpatient management but needed transfer the patient is still inpatient status appropriate  The patients vitals on arrival were ED Triage Vitals   Temperature Pulse Respirations Blood Pressure SpO2   01/16/20 0400 01/16/20 0345 01/16/20 0345 01/16/20 0345 01/16/20 0345   98 3 °F (36 8 °C) (!) 124 (!) 28 165/99 97 %      Temp Source Heart Rate Source Patient Position - Orthostatic VS BP Location FiO2 (%)   01/16/20 0400 -- 01/16/20 0643 01/16/20 0643 --   Temporal  Lying Right arm       Pain Score       01/16/20 0344       No Pain           Past Medical History:   Diagnosis Date    Constipation     Diabetes mellitus (HCC)     Hemorrhoids     Hypertension      Past Surgical History:   Procedure Laterality Date    HEMORROIDECTOMY      TUBAL LIGATION Bilateral            Consults have been placed to:   IP CONSULT TO GASTROENTEROLOGY  IP CONSULT TO HEMATOLOGY  IP CONSULT TO NEPHROLOGY  IP CONSULT TO NUTRITION SERVICES  IP CONSULT TO ACUTE CARE SURGERY  IP CONSULT TO ENDOCRINOLOGY  IP CONSULT TO PICC TEAM    Vitals:    01/17/20 1115 01/17/20 1132 01/17/20 1145 01/17/20 1330   BP:  115/77 115/77    Pulse:  (!) 107 (!) 114 (!) 108   Resp:   22 (!) 27   Temp: 99 °F (37 2 °C)      TempSrc: Temporal      SpO2:   92% 93%   Weight:       Height:           Most recent labs:    No results for input(s): WBC, HGB, HCT, PLT, K, NA, CALCIUM, BUN, CREATININE, LIPASE, AMYLASE, INR, TROPONINI, CKTOTAL, AST, ALT, ALKPHOS, BILITOT in the last 72 hours  Scheduled Meds:  Continuous Infusions:  No current facility-administered medications for this encounter  PRN Meds:      Surgical procedures (if appropriate):

## 2020-01-27 RX ORDER — UBIQUINOL 100 MG
CAPSULE ORAL
Qty: 100 EACH | Refills: 0 | Status: SHIPPED | OUTPATIENT
Start: 2020-01-27 | End: 2020-02-03

## 2020-01-29 DIAGNOSIS — E11.9 TYPE 2 DIABETES MELLITUS WITHOUT COMPLICATION, WITHOUT LONG-TERM CURRENT USE OF INSULIN (HCC): ICD-10-CM

## 2020-01-29 RX ORDER — UBIQUINOL 100 MG
CAPSULE ORAL
Qty: 100 EACH | Refills: 0 | OUTPATIENT
Start: 2020-01-29

## 2020-01-30 DIAGNOSIS — E11.9 TYPE 2 DIABETES MELLITUS WITHOUT COMPLICATION, WITHOUT LONG-TERM CURRENT USE OF INSULIN (HCC): ICD-10-CM

## 2020-02-03 RX ORDER — UBIQUINOL 100 MG
CAPSULE ORAL
Qty: 100 EACH | Refills: 0 | Status: SHIPPED | OUTPATIENT
Start: 2020-02-03 | End: 2020-02-12

## 2020-02-11 DIAGNOSIS — E11.9 TYPE 2 DIABETES MELLITUS WITHOUT COMPLICATION, WITHOUT LONG-TERM CURRENT USE OF INSULIN (HCC): ICD-10-CM

## 2020-02-12 DIAGNOSIS — E11.9 TYPE 2 DIABETES MELLITUS WITHOUT COMPLICATION, WITHOUT LONG-TERM CURRENT USE OF INSULIN (HCC): ICD-10-CM

## 2020-02-12 RX ORDER — UBIQUINOL 100 MG
CAPSULE ORAL
Qty: 100 EACH | Refills: 0 | Status: SHIPPED | OUTPATIENT
Start: 2020-02-12 | End: 2020-02-13

## 2020-02-13 RX ORDER — UBIQUINOL 100 MG
CAPSULE ORAL
Qty: 100 EACH | Refills: 5 | Status: SHIPPED | OUTPATIENT
Start: 2020-02-13 | End: 2020-02-17

## 2020-02-15 DIAGNOSIS — E11.9 TYPE 2 DIABETES MELLITUS WITHOUT COMPLICATION, WITHOUT LONG-TERM CURRENT USE OF INSULIN (HCC): ICD-10-CM

## 2020-02-17 RX ORDER — UBIQUINOL 100 MG
CAPSULE ORAL
Qty: 100 EACH | Refills: 5 | Status: SHIPPED | OUTPATIENT
Start: 2020-02-17

## 2020-02-19 DIAGNOSIS — E11.9 TYPE 2 DIABETES MELLITUS WITHOUT COMPLICATION, WITHOUT LONG-TERM CURRENT USE OF INSULIN (HCC): ICD-10-CM

## 2020-02-19 RX ORDER — UBIQUINOL 100 MG
CAPSULE ORAL
Qty: 100 EACH | Refills: 5 | OUTPATIENT
Start: 2020-02-19

## 2021-04-07 DIAGNOSIS — E11.9 TYPE 2 DIABETES MELLITUS WITHOUT COMPLICATION, WITHOUT LONG-TERM CURRENT USE OF INSULIN (HCC): ICD-10-CM

## 2021-04-07 RX ORDER — UBIQUINOL 100 MG
CAPSULE ORAL
Qty: 100 EACH | Refills: 5 | OUTPATIENT
Start: 2021-04-07